# Patient Record
Sex: MALE | Race: OTHER | NOT HISPANIC OR LATINO | Employment: OTHER | URBAN - METROPOLITAN AREA
[De-identification: names, ages, dates, MRNs, and addresses within clinical notes are randomized per-mention and may not be internally consistent; named-entity substitution may affect disease eponyms.]

---

## 2023-09-05 ENCOUNTER — HOSPITAL ENCOUNTER (INPATIENT)
Facility: HOSPITAL | Age: 33
LOS: 10 days | Discharge: HOME HEALTH | DRG: 952 | End: 2023-09-15
Attending: STUDENT IN AN ORGANIZED HEALTH CARE EDUCATION/TRAINING PROGRAM | Admitting: STUDENT IN AN ORGANIZED HEALTH CARE EDUCATION/TRAINING PROGRAM
Payer: COMMERCIAL

## 2023-09-05 ENCOUNTER — ANESTHESIA (EMERGENCY)
Dept: PERIOP | Facility: HOSPITAL | Age: 33
DRG: 952 | End: 2023-09-05
Payer: COMMERCIAL

## 2023-09-05 ENCOUNTER — ANESTHESIA EVENT (EMERGENCY)
Dept: PERIOP | Facility: HOSPITAL | Age: 33
DRG: 952 | End: 2023-09-05
Payer: COMMERCIAL

## 2023-09-05 ENCOUNTER — APPOINTMENT (EMERGENCY)
Dept: RADIOLOGY | Facility: HOSPITAL | Age: 33
DRG: 952 | End: 2023-09-05
Payer: COMMERCIAL

## 2023-09-05 ENCOUNTER — APPOINTMENT (INPATIENT)
Dept: RADIOLOGY | Facility: HOSPITAL | Age: 33
DRG: 952 | End: 2023-09-05
Payer: COMMERCIAL

## 2023-09-05 DIAGNOSIS — F43.0 ACUTE STRESS REACTION: ICD-10-CM
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
## 2023-09-05 DIAGNOSIS — S43.014A ANTERIOR DISLOCATION OF RIGHT SHOULDER: ICD-10-CM

## 2023-09-05 DIAGNOSIS — S43.034A: ICD-10-CM

## 2023-09-05 LAB
ABO GROUP BLD BPU: NORMAL
ABO GROUP BLD: NORMAL
ABO GROUP BLD: NORMAL
ALBUMIN SERPL BCP-MCNC: 3.8 G/DL (ref 3.5–5)
ALP SERPL-CCNC: 36 U/L (ref 34–104)
ALT SERPL W P-5'-P-CCNC: 85 U/L (ref 7–52)
ANION GAP SERPL CALCULATED.3IONS-SCNC: 8 MMOL/L
APTT PPP: 35 SECONDS (ref 23–37)
AST SERPL W P-5'-P-CCNC: 76 U/L (ref 13–39)
ATRIAL RATE: 104 BPM
BASE EXCESS BLDA CALC-SCNC: -16 MMOL/L (ref -2–3)
BASE EXCESS BLDA CALC-SCNC: -18 MMOL/L (ref -2–3)
BASE EXCESS BLDA CALC-SCNC: -5.9 MMOL/L
BASE EXCESS BLDA CALC-SCNC: -9 MMOL/L (ref -2–3)
BASOPHILS # BLD AUTO: 0.04 THOUSANDS/ÂΜL (ref 0–0.1)
BASOPHILS NFR BLD AUTO: 0 % (ref 0–1)
BILIRUB SERPL-MCNC: 0.9 MG/DL (ref 0.2–1)
BLD GP AB SCN SERPL QL: NEGATIVE
BPU ID: NORMAL
BUN SERPL-MCNC: 11 MG/DL (ref 5–25)
CA-I BLD-SCNC: 1.09 MMOL/L (ref 1.12–1.32)
CA-I BLD-SCNC: 1.14 MMOL/L (ref 1.12–1.32)
CA-I BLD-SCNC: 1.15 MMOL/L (ref 1.12–1.32)
CA-I BLD-SCNC: 1.19 MMOL/L (ref 1.12–1.32)
CALCIUM SERPL-MCNC: 7.8 MG/DL (ref 8.4–10.2)
CFFMA (FUNCTIONAL FIBRINOGEN MAX AMPLITUDE): 9.4 MM (ref 15–32)
CHLORIDE SERPL-SCNC: 112 MMOL/L (ref 96–108)
CK SERPL-CCNC: 906 U/L (ref 39–308)
CKLY30: 0 % (ref 0–2.6)
CKR(REACTION TIME): 7.3 MIN (ref 4.6–9.1)
CO2 SERPL-SCNC: 22 MMOL/L (ref 21–32)
CREAT SERPL-MCNC: 1.12 MG/DL (ref 0.6–1.3)
CRTMA(RAPIDTEG MAX AMPLITUDE): 44.9 MM (ref 52–70)
DS:DELIVERY SYSTEM: 1
EOSINOPHIL # BLD AUTO: 0 THOUSAND/ÂΜL (ref 0–0.61)
EOSINOPHIL NFR BLD AUTO: 0 % (ref 0–6)
ERYTHROCYTE [DISTWIDTH] IN BLOOD BY AUTOMATED COUNT: 13.5 % (ref 11.6–15.1)
GFR SERPL CREATININE-BSD FRML MDRD: 85 ML/MIN/1.73SQ M
GLUCOSE SERPL-MCNC: 151 MG/DL (ref 65–140)
GLUCOSE SERPL-MCNC: 199 MG/DL (ref 65–140)
GLUCOSE SERPL-MCNC: 225 MG/DL (ref 65–140)
GLUCOSE SERPL-MCNC: 239 MG/DL (ref 65–140)
HCO3 BLDA-SCNC: 13.7 MMOL/L (ref 22–28)
HCO3 BLDA-SCNC: 14.4 MMOL/L (ref 22–28)
HCO3 BLDA-SCNC: 17.3 MMOL/L (ref 22–28)
HCO3 BLDA-SCNC: 19.8 MMOL/L (ref 22–28)
HCT VFR BLD AUTO: 34.3 % (ref 36.5–49.3)
HCT VFR BLD AUTO: 37.8 % (ref 36.5–49.3)
HCT VFR BLD CALC: 29 % (ref 36.5–49.3)
HCT VFR BLD CALC: 31 % (ref 36.5–49.3)
HCT VFR BLD CALC: 32 % (ref 36.5–49.3)
HGB BLD-MCNC: 12.3 G/DL (ref 12–17)
HGB BLD-MCNC: 13.5 G/DL (ref 12–17)
HGB BLDA-MCNC: 10.5 G/DL (ref 12–17)
HGB BLDA-MCNC: 10.9 G/DL (ref 12–17)
HGB BLDA-MCNC: 9.9 G/DL (ref 12–17)
IMM GRANULOCYTES # BLD AUTO: 0.26 THOUSAND/UL (ref 0–0.2)
IMM GRANULOCYTES NFR BLD AUTO: 1 % (ref 0–2)
INR PPP: 1.42 (ref 0.84–1.19)
LACTATE SERPL-SCNC: 5.5 MMOL/L (ref 0.5–2)
LYMPHOCYTES # BLD AUTO: 1.01 THOUSANDS/ÂΜL (ref 0.6–4.47)
LYMPHOCYTES NFR BLD AUTO: 5 % (ref 14–44)
MAGNESIUM SERPL-MCNC: 2.1 MG/DL (ref 1.9–2.7)
MCH RBC QN AUTO: 30.6 PG (ref 26.8–34.3)
MCHC RBC AUTO-ENTMCNC: 35.7 G/DL (ref 31.4–37.4)
MCV RBC AUTO: 86 FL (ref 82–98)
MONOCYTES # BLD AUTO: 1.49 THOUSAND/ÂΜL (ref 0.17–1.22)
MONOCYTES NFR BLD AUTO: 7 % (ref 4–12)
NEUTROPHILS # BLD AUTO: 18.72 THOUSANDS/ÂΜL (ref 1.85–7.62)
NEUTS SEG NFR BLD AUTO: 87 % (ref 43–75)
NRBC BLD AUTO-RTO: 0 /100 WBCS
O2 CT BLDA-SCNC: 20.3 ML/DL (ref 16–23)
OXYHGB MFR BLDA: 98.4 % (ref 94–97)
P AXIS: 66 DEGREES
PCO2 BLD: 15 MMOL/L (ref 21–32)
PCO2 BLD: 16 MMOL/L (ref 21–32)
PCO2 BLD: 19 MMOL/L (ref 21–32)
PCO2 BLD: 40.5 MM HG (ref 36–44)
PCO2 BLD: 49.9 MM HG (ref 36–44)
PCO2 BLD: 67.5 MM HG (ref 36–44)
PCO2 BLDA: 39.6 MM HG (ref 36–44)
PH BLD: 6.94 [PH] (ref 7.35–7.45)
PH BLD: 7.04 [PH] (ref 7.35–7.45)
PH BLD: 7.24 [PH] (ref 7.35–7.45)
PH BLDA: 7.32 [PH] (ref 7.35–7.45)
PHOSPHATE SERPL-MCNC: 2.6 MG/DL (ref 2.7–4.5)
PLATELET # BLD AUTO: 143 THOUSANDS/UL (ref 149–390)
PMV BLD AUTO: 9.7 FL (ref 8.9–12.7)
PO2 BLD: 157 MM HG (ref 75–129)
PO2 BLD: >400 MM HG (ref 75–129)
PO2 BLD: >400 MM HG (ref 75–129)
PO2 BLDA: 307.4 MM HG (ref 75–129)
POTASSIUM BLD-SCNC: 3.3 MMOL/L (ref 3.5–5.3)
POTASSIUM BLD-SCNC: 3.7 MMOL/L (ref 3.5–5.3)
POTASSIUM BLD-SCNC: 4 MMOL/L (ref 3.5–5.3)
POTASSIUM SERPL-SCNC: 4.6 MMOL/L (ref 3.5–5.3)
PR INTERVAL: 163 MS
PROT SERPL-MCNC: 5.4 G/DL (ref 6.4–8.4)
PROTHROMBIN TIME: 17.6 SECONDS (ref 11.6–14.5)
QRS AXIS: 74 DEGREES
QRSD INTERVAL: 113 MS
QT INTERVAL: 367 MS
QTC INTERVAL: 483 MS
RBC # BLD AUTO: 4.41 MILLION/UL (ref 3.88–5.62)
RH BLD: POSITIVE
RH BLD: POSITIVE
SAO2 % BLD FROM PO2: 99 % (ref 60–85)
SIMV VENT INSPIRED AIR FIO2: 60
SIMV VENT PEEP: 6
SIMV VENT TIDAL VOLUME: 500
SIMV VENT: ABNORMAL
SODIUM BLD-SCNC: 144 MMOL/L (ref 136–145)
SODIUM BLD-SCNC: 145 MMOL/L (ref 136–145)
SODIUM BLD-SCNC: 145 MMOL/L (ref 136–145)
SODIUM SERPL-SCNC: 142 MMOL/L (ref 135–147)
SPECIMEN EXPIRATION DATE: NORMAL
SPECIMEN SOURCE: ABNORMAL
T WAVE AXIS: 36 DEGREES
UNIT DISPENSE STATUS: NORMAL
UNIT PRODUCT CODE: NORMAL
UNIT PRODUCT VOLUME: 238 ML
UNIT PRODUCT VOLUME: 250 ML
UNIT PRODUCT VOLUME: 280 ML
UNIT PRODUCT VOLUME: 300 ML
UNIT PRODUCT VOLUME: 350 ML
UNIT RH: NORMAL
VENTRICULAR RATE: 104 BPM
WBC # BLD AUTO: 21.52 THOUSAND/UL (ref 4.31–10.16)

## 2023-09-05 PROCEDURE — 99291 CRITICAL CARE FIRST HOUR: CPT | Performed by: EMERGENCY MEDICINE

## 2023-09-05 PROCEDURE — 86850 RBC ANTIBODY SCREEN: CPT | Performed by: PHYSICIAN ASSISTANT

## 2023-09-05 PROCEDURE — 86900 BLOOD TYPING SEROLOGIC ABO: CPT | Performed by: PHYSICIAN ASSISTANT

## 2023-09-05 PROCEDURE — 5A12012 PERFORMANCE OF CARDIAC OUTPUT, SINGLE, MANUAL: ICD-10-PCS | Performed by: UROLOGY

## 2023-09-05 PROCEDURE — 86901 BLOOD TYPING SEROLOGIC RH(D): CPT | Performed by: PHYSICIAN ASSISTANT

## 2023-09-05 PROCEDURE — 02HV33Z INSERTION OF INFUSION DEVICE INTO SUPERIOR VENA CAVA, PERCUTANEOUS APPROACH: ICD-10-PCS | Performed by: NURSE ANESTHETIST, CERTIFIED REGISTERED

## 2023-09-05 PROCEDURE — 84132 ASSAY OF SERUM POTASSIUM: CPT

## 2023-09-05 PROCEDURE — 93005 ELECTROCARDIOGRAM TRACING: CPT

## 2023-09-05 PROCEDURE — 71260 CT THORAX DX C+: CPT

## 2023-09-05 PROCEDURE — 84295 ASSAY OF SERUM SODIUM: CPT

## 2023-09-05 PROCEDURE — 99255 IP/OBS CONSLTJ NEW/EST HI 80: CPT | Performed by: SURGERY

## 2023-09-05 PROCEDURE — G0390 TRAUMA RESPONS W/HOSP CRITI: HCPCS

## 2023-09-05 PROCEDURE — 80053 COMPREHEN METABOLIC PANEL: CPT | Performed by: PHYSICIAN ASSISTANT

## 2023-09-05 PROCEDURE — 82803 BLOOD GASES ANY COMBINATION: CPT

## 2023-09-05 PROCEDURE — 83605 ASSAY OF LACTIC ACID: CPT | Performed by: PHYSICIAN ASSISTANT

## 2023-09-05 PROCEDURE — 92950 HEART/LUNG RESUSCITATION CPR: CPT | Performed by: SURGERY

## 2023-09-05 PROCEDURE — 85014 HEMATOCRIT: CPT | Performed by: EMERGENCY MEDICINE

## 2023-09-05 PROCEDURE — 90715 TDAP VACCINE 7 YRS/> IM: CPT | Performed by: STUDENT IN AN ORGANIZED HEALTH CARE EDUCATION/TRAINING PROGRAM

## 2023-09-05 PROCEDURE — 85576 BLOOD PLATELET AGGREGATION: CPT | Performed by: PHYSICIAN ASSISTANT

## 2023-09-05 PROCEDURE — 20103 EXPL PENTRG WOUND EXTREMITY: CPT | Performed by: STUDENT IN AN ORGANIZED HEALTH CARE EDUCATION/TRAINING PROGRAM

## 2023-09-05 PROCEDURE — 99291 CRITICAL CARE FIRST HOUR: CPT | Performed by: PHYSICIAN ASSISTANT

## 2023-09-05 PROCEDURE — 0BH17EZ INSERTION OF ENDOTRACHEAL AIRWAY INTO TRACHEA, VIA NATURAL OR ARTIFICIAL OPENING: ICD-10-PCS | Performed by: ANESTHESIOLOGY

## 2023-09-05 PROCEDURE — 85610 PROTHROMBIN TIME: CPT | Performed by: PHYSICIAN ASSISTANT

## 2023-09-05 PROCEDURE — 93010 ELECTROCARDIOGRAM REPORT: CPT | Performed by: INTERNAL MEDICINE

## 2023-09-05 PROCEDURE — 86920 COMPATIBILITY TEST SPIN: CPT

## 2023-09-05 PROCEDURE — 5A1945Z RESPIRATORY VENTILATION, 24-96 CONSECUTIVE HOURS: ICD-10-PCS | Performed by: ANESTHESIOLOGY

## 2023-09-05 PROCEDURE — P9016 RBC LEUKOCYTES REDUCED: HCPCS

## 2023-09-05 PROCEDURE — 85347 COAGULATION TIME ACTIVATED: CPT | Performed by: PHYSICIAN ASSISTANT

## 2023-09-05 PROCEDURE — 27301 DRAIN THIGH/KNEE LESION: CPT | Performed by: SURGERY

## 2023-09-05 PROCEDURE — 85730 THROMBOPLASTIN TIME PARTIAL: CPT | Performed by: PHYSICIAN ASSISTANT

## 2023-09-05 PROCEDURE — 82330 ASSAY OF CALCIUM: CPT

## 2023-09-05 PROCEDURE — 85018 HEMOGLOBIN: CPT | Performed by: EMERGENCY MEDICINE

## 2023-09-05 PROCEDURE — 82947 ASSAY GLUCOSE BLOOD QUANT: CPT

## 2023-09-05 PROCEDURE — 90471 IMMUNIZATION ADMIN: CPT

## 2023-09-05 PROCEDURE — 82330 ASSAY OF CALCIUM: CPT | Performed by: PHYSICIAN ASSISTANT

## 2023-09-05 PROCEDURE — 10120 INC&RMVL FB SUBQ TISS SMPL: CPT | Performed by: UROLOGY

## 2023-09-05 PROCEDURE — 85397 CLOTTING FUNCT ACTIVITY: CPT | Performed by: PHYSICIAN ASSISTANT

## 2023-09-05 PROCEDURE — EDAIR PR ED AIR: Performed by: EMERGENCY MEDICINE

## 2023-09-05 PROCEDURE — 82550 ASSAY OF CK (CPK): CPT | Performed by: PHYSICIAN ASSISTANT

## 2023-09-05 PROCEDURE — 0VCS3ZZ EXTIRPATION OF MATTER FROM PENIS, PERCUTANEOUS APPROACH: ICD-10-PCS | Performed by: UROLOGY

## 2023-09-05 PROCEDURE — 85025 COMPLETE CBC W/AUTO DIFF WBC: CPT | Performed by: PHYSICIAN ASSISTANT

## 2023-09-05 PROCEDURE — 74177 CT ABD & PELVIS W/CONTRAST: CPT

## 2023-09-05 PROCEDURE — 84100 ASSAY OF PHOSPHORUS: CPT | Performed by: PHYSICIAN ASSISTANT

## 2023-09-05 PROCEDURE — 71045 X-RAY EXAM CHEST 1 VIEW: CPT

## 2023-09-05 PROCEDURE — 94760 N-INVAS EAR/PLS OXIMETRY 1: CPT

## 2023-09-05 PROCEDURE — 85014 HEMATOCRIT: CPT

## 2023-09-05 PROCEDURE — 83735 ASSAY OF MAGNESIUM: CPT | Performed by: PHYSICIAN ASSISTANT

## 2023-09-05 PROCEDURE — 94002 VENT MGMT INPAT INIT DAY: CPT

## 2023-09-05 PROCEDURE — 82805 BLOOD GASES W/O2 SATURATION: CPT | Performed by: PHYSICIAN ASSISTANT

## 2023-09-05 PROCEDURE — 85384 FIBRINOGEN ACTIVITY: CPT | Performed by: PHYSICIAN ASSISTANT

## 2023-09-05 PROCEDURE — 0TJB8ZZ INSPECTION OF BLADDER, VIA NATURAL OR ARTIFICIAL OPENING ENDOSCOPIC: ICD-10-PCS | Performed by: UROLOGY

## 2023-09-05 PROCEDURE — 52000 CYSTOURETHROSCOPY: CPT | Performed by: UROLOGY

## 2023-09-05 PROCEDURE — 99291 CRITICAL CARE FIRST HOUR: CPT

## 2023-09-05 PROCEDURE — 96374 THER/PROPH/DIAG INJ IV PUSH: CPT

## 2023-09-05 RX ORDER — ALBUMIN, HUMAN INJ 5% 5 %
SOLUTION INTRAVENOUS CONTINUOUS PRN
Status: DISCONTINUED | OUTPATIENT
Start: 2023-09-05 | End: 2023-09-05

## 2023-09-05 RX ORDER — PROPOFOL 10 MG/ML
5-50 INJECTION, EMULSION INTRAVENOUS
Status: DISCONTINUED | OUTPATIENT
Start: 2023-09-05 | End: 2023-09-06

## 2023-09-05 RX ORDER — CEFAZOLIN SODIUM 1 G/3ML
2 INJECTION, POWDER, FOR SOLUTION INTRAMUSCULAR; INTRAVENOUS ONCE
Status: COMPLETED | OUTPATIENT
Start: 2023-09-05 | End: 2023-09-05

## 2023-09-05 RX ORDER — MAGNESIUM HYDROXIDE 1200 MG/15ML
LIQUID ORAL AS NEEDED
Status: DISCONTINUED | OUTPATIENT
Start: 2023-09-05 | End: 2023-09-05 | Stop reason: HOSPADM

## 2023-09-05 RX ORDER — FENTANYL CITRATE 50 UG/ML
INJECTION, SOLUTION INTRAMUSCULAR; INTRAVENOUS AS NEEDED
Status: DISCONTINUED | OUTPATIENT
Start: 2023-09-05 | End: 2023-09-05

## 2023-09-05 RX ORDER — FENTANYL CITRATE 50 UG/ML
INJECTION, SOLUTION INTRAMUSCULAR; INTRAVENOUS CODE/TRAUMA/SEDATION MEDICATION
Status: COMPLETED | OUTPATIENT
Start: 2023-09-05 | End: 2023-09-05

## 2023-09-05 RX ORDER — SODIUM CHLORIDE 9 MG/ML
INJECTION, SOLUTION INTRAVENOUS CONTINUOUS PRN
Status: DISCONTINUED | OUTPATIENT
Start: 2023-09-05 | End: 2023-09-05

## 2023-09-05 RX ORDER — CHLORHEXIDINE GLUCONATE ORAL RINSE 1.2 MG/ML
15 SOLUTION DENTAL EVERY 12 HOURS SCHEDULED
Status: DISCONTINUED | OUTPATIENT
Start: 2023-09-05 | End: 2023-09-07

## 2023-09-05 RX ORDER — NEOMYCIN SULFATE, POLYMYXIN B SULFATE AND BACITRACIN ZINC 3.5; 10000; 4 MG/G; [USP'U]/G; [USP'U]/G
OINTMENT OPHTHALMIC AS NEEDED
Status: DISCONTINUED | OUTPATIENT
Start: 2023-09-05 | End: 2023-09-05 | Stop reason: HOSPADM

## 2023-09-05 RX ORDER — SODIUM CHLORIDE, SODIUM LACTATE, POTASSIUM CHLORIDE, CALCIUM CHLORIDE 600; 310; 30; 20 MG/100ML; MG/100ML; MG/100ML; MG/100ML
INJECTION, SOLUTION INTRAVENOUS CONTINUOUS PRN
Status: DISCONTINUED | OUTPATIENT
Start: 2023-09-05 | End: 2023-09-05

## 2023-09-05 RX ORDER — AMOXICILLIN 250 MG
2 CAPSULE ORAL 2 TIMES DAILY
Status: DISCONTINUED | OUTPATIENT
Start: 2023-09-05 | End: 2023-09-15 | Stop reason: HOSPADM

## 2023-09-05 RX ORDER — SODIUM CHLORIDE, SODIUM GLUCONATE, SODIUM ACETATE, POTASSIUM CHLORIDE, MAGNESIUM CHLORIDE, SODIUM PHOSPHATE, DIBASIC, AND POTASSIUM PHOSPHATE .53; .5; .37; .037; .03; .012; .00082 G/100ML; G/100ML; G/100ML; G/100ML; G/100ML; G/100ML; G/100ML
125 INJECTION, SOLUTION INTRAVENOUS CONTINUOUS
Status: DISCONTINUED | OUTPATIENT
Start: 2023-09-05 | End: 2023-09-07

## 2023-09-05 RX ORDER — FENTANYL CITRATE 50 UG/ML
1 INJECTION, SOLUTION INTRAMUSCULAR; INTRAVENOUS ONCE
Status: COMPLETED | OUTPATIENT
Start: 2023-09-05 | End: 2023-09-05

## 2023-09-05 RX ORDER — FENTANYL CITRATE-0.9 % NACL/PF 10 MCG/ML
100 PLASTIC BAG, INJECTION (ML) INTRAVENOUS CONTINUOUS
Status: DISCONTINUED | OUTPATIENT
Start: 2023-09-05 | End: 2023-09-06

## 2023-09-05 RX ORDER — ROCURONIUM BROMIDE 10 MG/ML
INJECTION, SOLUTION INTRAVENOUS AS NEEDED
Status: DISCONTINUED | OUTPATIENT
Start: 2023-09-05 | End: 2023-09-05

## 2023-09-05 RX ORDER — FENTANYL CITRATE 50 UG/ML
50 INJECTION, SOLUTION INTRAMUSCULAR; INTRAVENOUS ONCE
Status: COMPLETED | OUTPATIENT
Start: 2023-09-05 | End: 2023-09-05

## 2023-09-05 RX ORDER — SUCCINYLCHOLINE/SOD CL,ISO/PF 100 MG/5ML
SYRINGE (ML) INTRAVENOUS AS NEEDED
Status: DISCONTINUED | OUTPATIENT
Start: 2023-09-05 | End: 2023-09-05

## 2023-09-05 RX ORDER — BUPIVACAINE HYDROCHLORIDE 2.5 MG/ML
INJECTION, SOLUTION EPIDURAL; INFILTRATION; INTRACAUDAL AS NEEDED
Status: DISCONTINUED | OUTPATIENT
Start: 2023-09-05 | End: 2023-09-05 | Stop reason: HOSPADM

## 2023-09-05 RX ORDER — PROPOFOL 10 MG/ML
INJECTION, EMULSION INTRAVENOUS CONTINUOUS PRN
Status: DISCONTINUED | OUTPATIENT
Start: 2023-09-05 | End: 2023-09-05

## 2023-09-05 RX ORDER — SODIUM CHLORIDE, SODIUM GLUCONATE, SODIUM ACETATE, POTASSIUM CHLORIDE, MAGNESIUM CHLORIDE, SODIUM PHOSPHATE, DIBASIC, AND POTASSIUM PHOSPHATE .53; .5; .37; .037; .03; .012; .00082 G/100ML; G/100ML; G/100ML; G/100ML; G/100ML; G/100ML; G/100ML
1000 INJECTION, SOLUTION INTRAVENOUS ONCE
Status: COMPLETED | OUTPATIENT
Start: 2023-09-05 | End: 2023-09-05

## 2023-09-05 RX ORDER — PROPOFOL 10 MG/ML
INJECTION, EMULSION INTRAVENOUS AS NEEDED
Status: DISCONTINUED | OUTPATIENT
Start: 2023-09-05 | End: 2023-09-05

## 2023-09-05 RX ORDER — VASOPRESSIN 20 U/ML
INJECTION PARENTERAL AS NEEDED
Status: DISCONTINUED | OUTPATIENT
Start: 2023-09-05 | End: 2023-09-05

## 2023-09-05 RX ORDER — HYDROMORPHONE HYDROCHLORIDE 2 MG/ML
INJECTION, SOLUTION INTRAMUSCULAR; INTRAVENOUS; SUBCUTANEOUS AS NEEDED
Status: DISCONTINUED | OUTPATIENT
Start: 2023-09-05 | End: 2023-09-05

## 2023-09-05 RX ORDER — CALCIUM CHLORIDE 100 MG/ML
INJECTION INTRAVENOUS; INTRAVENTRICULAR AS NEEDED
Status: DISCONTINUED | OUTPATIENT
Start: 2023-09-05 | End: 2023-09-05

## 2023-09-05 RX ORDER — FENTANYL CITRATE-0.9 % NACL/PF 10 MCG/ML
50 PLASTIC BAG, INJECTION (ML) INTRAVENOUS CONTINUOUS
Status: DISCONTINUED | OUTPATIENT
Start: 2023-09-05 | End: 2023-09-05

## 2023-09-05 RX ORDER — BISACODYL 10 MG
10 SUPPOSITORY, RECTAL RECTAL DAILY PRN
Status: DISCONTINUED | OUTPATIENT
Start: 2023-09-05 | End: 2023-09-15 | Stop reason: HOSPADM

## 2023-09-05 RX ORDER — FENTANYL CITRATE 50 UG/ML
50 INJECTION, SOLUTION INTRAMUSCULAR; INTRAVENOUS
Status: DISCONTINUED | OUTPATIENT
Start: 2023-09-05 | End: 2023-09-07

## 2023-09-05 RX ADMIN — SODIUM BICARBONATE 50 MEQ: 84 INJECTION, SOLUTION INTRAVENOUS at 17:53

## 2023-09-05 RX ADMIN — SODIUM CHLORIDE, SODIUM LACTATE, POTASSIUM CHLORIDE, AND CALCIUM CHLORIDE: .6; .31; .03; .02 INJECTION, SOLUTION INTRAVENOUS at 17:48

## 2023-09-05 RX ADMIN — FENTANYL CITRATE 50 MCG: 50 INJECTION, SOLUTION INTRAMUSCULAR; INTRAVENOUS at 18:16

## 2023-09-05 RX ADMIN — FENTANYL CITRATE 50 MCG: 50 INJECTION INTRAMUSCULAR; INTRAVENOUS at 19:56

## 2023-09-05 RX ADMIN — FENTANYL CITRATE 50 MCG: 50 INJECTION, SOLUTION INTRAMUSCULAR; INTRAVENOUS at 18:05

## 2023-09-05 RX ADMIN — PROPOFOL 150 MG: 10 INJECTION, EMULSION INTRAVENOUS at 17:05

## 2023-09-05 RX ADMIN — ALBUMIN (HUMAN): 12.5 INJECTION, SOLUTION INTRAVENOUS at 17:25

## 2023-09-05 RX ADMIN — PHENYLEPHRINE HYDROCHLORIDE 100 MCG/MIN: 10 INJECTION INTRAVENOUS at 17:11

## 2023-09-05 RX ADMIN — PHENYLEPHRINE HYDROCHLORIDE 200 MCG: 10 INJECTION INTRAVENOUS at 17:11

## 2023-09-05 RX ADMIN — SODIUM CHLORIDE, SODIUM GLUCONATE, SODIUM ACETATE, POTASSIUM CHLORIDE, MAGNESIUM CHLORIDE, SODIUM PHOSPHATE, DIBASIC, AND POTASSIUM PHOSPHATE 125 ML/HR: .53; .5; .37; .037; .03; .012; .00082 INJECTION, SOLUTION INTRAVENOUS at 20:03

## 2023-09-05 RX ADMIN — VASOPRESSIN 3 UNITS: 20 INJECTION, SOLUTION INTRAMUSCULAR; SUBCUTANEOUS at 17:14

## 2023-09-05 RX ADMIN — ROCURONIUM BROMIDE 20 MG: 10 INJECTION, SOLUTION INTRAVENOUS at 18:03

## 2023-09-05 RX ADMIN — FENTANYL CITRATE 50 MCG: 50 INJECTION, SOLUTION INTRAMUSCULAR; INTRAVENOUS at 16:53

## 2023-09-05 RX ADMIN — TETANUS TOXOID, REDUCED DIPHTHERIA TOXOID AND ACELLULAR PERTUSSIS VACCINE, ADSORBED 0.5 ML: 5; 2.5; 8; 8; 2.5 SUSPENSION INTRAMUSCULAR at 16:35

## 2023-09-05 RX ADMIN — FENTANYL CITRATE 50 MCG: 50 INJECTION INTRAMUSCULAR; INTRAVENOUS at 22:48

## 2023-09-05 RX ADMIN — FENTANYL CITRATE 50 MCG: 50 INJECTION INTRAMUSCULAR; INTRAVENOUS at 20:13

## 2023-09-05 RX ADMIN — PHENYLEPHRINE HYDROCHLORIDE 400 MCG: 10 INJECTION INTRAVENOUS at 17:09

## 2023-09-05 RX ADMIN — HYDROMORPHONE HYDROCHLORIDE 2 MG: 2 INJECTION INTRAMUSCULAR; INTRAVENOUS; SUBCUTANEOUS at 18:47

## 2023-09-05 RX ADMIN — SODIUM CHLORIDE: 0.9 INJECTION, SOLUTION INTRAVENOUS at 16:59

## 2023-09-05 RX ADMIN — CHLORHEXIDINE GLUCONATE 15 ML: 1.2 SOLUTION ORAL at 20:16

## 2023-09-05 RX ADMIN — ROCURONIUM BROMIDE 10 MG: 10 INJECTION, SOLUTION INTRAVENOUS at 18:49

## 2023-09-05 RX ADMIN — ALBUMIN (HUMAN): 12.5 INJECTION, SOLUTION INTRAVENOUS at 17:06

## 2023-09-05 RX ADMIN — Medication 1000 MCG: at 17:08

## 2023-09-05 RX ADMIN — FENTANYL CITRATE 50 MCG: 50 INJECTION, SOLUTION INTRAMUSCULAR; INTRAVENOUS at 16:45

## 2023-09-05 RX ADMIN — CALCIUM CHLORIDE 1 G: 100 INJECTION INTRAVENOUS; INTRAVENTRICULAR at 17:13

## 2023-09-05 RX ADMIN — SODIUM CHLORIDE: 0.9 INJECTION, SOLUTION INTRAVENOUS at 17:46

## 2023-09-05 RX ADMIN — EPINEPHRINE 10 MCG/MIN: 1 INJECTION, SOLUTION, CONCENTRATE INTRAVENOUS at 17:11

## 2023-09-05 RX ADMIN — Medication 100 MG: at 17:05

## 2023-09-05 RX ADMIN — FENTANYL CITRATE 100 MCG: 50 INJECTION, SOLUTION INTRAMUSCULAR; INTRAVENOUS at 17:05

## 2023-09-05 RX ADMIN — IOHEXOL 100 ML: 350 INJECTION, SOLUTION INTRAVENOUS at 16:56

## 2023-09-05 RX ADMIN — PHENYLEPHRINE HYDROCHLORIDE 200 MCG: 10 INJECTION INTRAVENOUS at 17:07

## 2023-09-05 RX ADMIN — SODIUM BICARBONATE 50 MEQ: 84 INJECTION, SOLUTION INTRAVENOUS at 17:55

## 2023-09-05 RX ADMIN — ROCURONIUM BROMIDE 50 MG: 10 INJECTION, SOLUTION INTRAVENOUS at 17:15

## 2023-09-05 RX ADMIN — Medication 50 MCG/HR: at 19:52

## 2023-09-05 RX ADMIN — SODIUM CHLORIDE, SODIUM GLUCONATE, SODIUM ACETATE, POTASSIUM CHLORIDE, MAGNESIUM CHLORIDE, SODIUM PHOSPHATE, DIBASIC, AND POTASSIUM PHOSPHATE 1000 ML: .53; .5; .37; .037; .03; .012; .00082 INJECTION, SOLUTION INTRAVENOUS at 20:47

## 2023-09-05 RX ADMIN — PROPOFOL 50 MCG/KG/MIN: 10 INJECTION, EMULSION INTRAVENOUS at 18:51

## 2023-09-05 RX ADMIN — SODIUM CHLORIDE, SODIUM LACTATE, POTASSIUM CHLORIDE, AND CALCIUM CHLORIDE: .6; .31; .03; .02 INJECTION, SOLUTION INTRAVENOUS at 16:59

## 2023-09-05 RX ADMIN — FENTANYL CITRATE 50 MCG: 50 INJECTION INTRAMUSCULAR; INTRAVENOUS at 21:39

## 2023-09-05 RX ADMIN — PROPOFOL 50 MCG/KG/MIN: 10 INJECTION, EMULSION INTRAVENOUS at 21:28

## 2023-09-05 RX ADMIN — SODIUM CHLORIDE: 0.9 INJECTION, SOLUTION INTRAVENOUS at 18:07

## 2023-09-05 RX ADMIN — FENTANYL CITRATE 50 MCG: 50 INJECTION INTRAMUSCULAR; INTRAVENOUS at 22:13

## 2023-09-05 RX ADMIN — SENNOSIDES AND DOCUSATE SODIUM 2 TABLET: 50; 8.6 TABLET ORAL at 20:16

## 2023-09-05 RX ADMIN — Medication 1000 MCG: at 17:10
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
## 2023-09-05 NOTE — LETTER
[address] [place] [address] 6  [address] [address]  Dept: [phone]    September 15, 2023     Patient: Geovanny Diallo   YOB: 1990   Date of Visit: 9/5/2023       To Whom it May Concern:    Geovanny Diallo is under my professional care. He was seen in the hospital from 9/5/2023 to 09/15/23. He  May not return to work until he is cleared by the trauma service after his appointment on 9/25 . If you have any questions or concerns, please don't hesitate to call.          Sincerely,          Chino Merchant PA-C

## 2023-09-05 NOTE — PLAN OF CARE
Problem: Prexisting or High Potential for Compromised Skin Integrity  Goal: Skin integrity is maintained or improved  Description: INTERVENTIONS:  - Identify patients at risk for skin breakdown  - Assess and monitor skin integrity  - Assess and monitor nutrition and hydration status  - Monitor labs   - Assess for incontinence   - Turn and reposition patient  - Assist with mobility/ambulation  - Relieve pressure over bony prominences  - Avoid friction and shearing  - Provide appropriate hygiene as needed including keeping skin clean and dry  - Evaluate need for skin moisturizer/barrier cream  - Collaborate with interdisciplinary team   - Patient/family teaching  - Consider wound care consult   Outcome: Progressing     Problem: MOBILITY - ADULT  Goal: Maintain or return to baseline ADL function  Description: INTERVENTIONS:  -  Assess patient's ability to carry out ADLs; assess patient's baseline for ADL function and identify physical deficits which impact ability to perform ADLs (bathing, care of mouth/teeth, toileting, grooming, dressing, etc.)  - Assess/evaluate cause of self-care deficits   - Assess range of motion  - Assess patient's mobility; develop plan if impaired  - Assess patient's need for assistive devices and provide as appropriate  - Encourage maximum independence but intervene and supervise when necessary  - Involve family in performance of ADLs  - Assess for home care needs following discharge   - Consider OT consult to assist with ADL evaluation and planning for discharge  - Provide patient education as appropriate  Outcome: Progressing  Goal: Maintains/Returns to pre admission functional level  Description: INTERVENTIONS:  - Perform BMAT or MOVE assessment daily.   - Set and communicate daily mobility goal to care team and patient/family/caregiver. - Collaborate with rehabilitation services on mobility goals if consulted  - Perform Range of Motion 3 times a day.   - Reposition patient every 2 hours.  - Dangle patient 3 times a day  - Stand patient 3 times a day  - Ambulate patient 3 times a day  - Out of bed to chair 3 times a day   - Out of bed for meals 3 times a day  - Out of bed for toileting  - Record patient progress and toleration of activity level   Outcome: Progressing

## 2023-09-05 NOTE — ANESTHESIA PROCEDURE NOTES
Central Line Insertion    Performed by: Carl Fuentes CRNA  Authorized by: Emily Gallego MD    Date/Time: 9/5/2023 5:00 PM  Catheter Type:  single lumen  Consent: The procedure was performed in an emergent situation. Test results: test results available and properly labeled  Site marked: the operative site was marked  Required items: required blood products, implants, devices, and special equipment available  Patient identity confirmed: arm band  Time out: Immediately prior to procedure a "time out" was called to verify the correct patient, procedure, equipment, support staff and site/side marked as required. Indications: vascular access  Patient position: flat  Preparation: skin prepped with ChloraPrep  Skin prep agent dried: skin prep agent completely dried prior to procedure  Sterile barriers: all five maximum sterile barriers used - cap, mask, sterile gown, sterile gloves, and large sterile sheet  Hand hygiene: hand hygiene performed prior to central venous catheter insertion  sterile gel and probe cover used in ultrasound-guided central venous catheter insertionReason All Sterile Barriers Not Used:   All elements of maximal sterile barrier technique not followed for medical reasons  Number of attempts: 1  Successful placement: yes
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
## 2023-09-05 NOTE — ANESTHESIA POSTPROCEDURE EVALUATION
Post-Op Assessment Note    CV Status:  Stable  Pain Score: 0    Pain management: adequate  Multimodal analgesia used between 6 hours prior to anesthesia start to PACU discharge    Post-procedure mental status: sedated. Hydration Status:  Stable   PONV Controlled:  None   Airway Patency:  Patent  Airway: intubated   Two or more mitigation strategies used for obstructive sleep apnea. There is a medical reason for not screening for obstructive sleep apnea and/or for not using two or more mitigation strategies   Post Op Vitals Reviewed: Yes      Staff: CRNA         No notable events documented.     BP   138/78   Temp  97.1   Pulse  97   Resp   16   SpO2   98
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
## 2023-09-05 NOTE — PROGRESS NOTES
Pastoral Care Progress Note    2023  Patient: Eva Mejias : 1990  Admission Date & Time: 2023 1632  MRN: [id number] CSN: [id number]                     Chaplaincy Interventions Utilized:   Empowerment: Clarified, confirmed, or reviewed information from treatment team     Exploration: Facilitated expression of regret    Relationship Building: Cultivated a relationship of care and support, Listened empathically, and Hospitality    Chaplaincy Outcomes Achieved:  Expressed intermediate hope and Identified meaningful connections    Friend, Celyeze Olivers, is present. Numbers of mother and brother are in pt phone. Will attempt to locate and notify family. Pt in OR. ED placed on lockdown.

## 2023-09-05 NOTE — ANESTHESIA PROCEDURE NOTES
Arterial Line Insertion    Performed by: Bhavna Colón MD  Authorized by: Bhavna Colón MD  Consent: The procedure was performed in an emergent situation. Verbal consent obtained. Written consent obtained. Consent given by: patient  Relevant documents: relevant documents present and verified  Site marked: the operative site was marked  Required items: required blood products, implants, devices, and special equipment available  Patient identity confirmed: verbally with patient and arm band  Time out: Immediately prior to procedure a "time out" was called to verify the correct patient, procedure, equipment, support staff and site/side marked as required. Preparation: Patient was prepped and draped in the usual sterile fashion.   Indications: multiple ABGs and hemodynamic monitoring  Orientation:  Right  Location: radial artery  Procedure Details:  Daniel's test normal: yes  Needle gauge: 20  Number of attempts: 1    Post-procedure:  Post-procedure: dressing applied  Waveform: good waveform  Post-procedure CNS: unchanged  Patient tolerance: patient tolerated the procedure well with no immediate complications

## 2023-09-06 ENCOUNTER — APPOINTMENT (INPATIENT)
Dept: RADIOLOGY | Facility: HOSPITAL | Age: 33
DRG: 952 | End: 2023-09-06
Payer: COMMERCIAL

## 2023-09-06 ENCOUNTER — APPOINTMENT (INPATIENT)
Dept: NON INVASIVE DIAGNOSTICS | Facility: HOSPITAL | Age: 33
DRG: 952 | End: 2023-09-06
Payer: COMMERCIAL

## 2023-09-06 LAB
ABO GROUP BLD BPU: NORMAL
ABO GROUP BLD BPU: NORMAL
ALBUMIN SERPL BCP-MCNC: 3.2 G/DL (ref 3.5–5)
ALP SERPL-CCNC: 29 U/L (ref 34–104)
ALT SERPL W P-5'-P-CCNC: 67 U/L (ref 7–52)
ANION GAP SERPL CALCULATED.3IONS-SCNC: 5 MMOL/L
AORTIC ROOT: 3.5 CM
AORTIC VALVE MEAN VELOCITY: 9.4 M/S
APICAL FOUR CHAMBER EJECTION FRACTION: 55 %
ASCENDING AORTA: 3.3 CM
AST SERPL W P-5'-P-CCNC: 78 U/L (ref 13–39)
AV AREA BY CONTINUOUS VTI: 2.8 CM2
AV AREA PEAK VELOCITY: 3 CM2
AV LVOT MEAN GRADIENT: 2 MMHG
AV LVOT PEAK GRADIENT: 4 MMHG
AV MEAN GRADIENT: 4 MMHG
AV PEAK GRADIENT: 8 MMHG
AV VALVE AREA: 2.81 CM2
AV VELOCITY RATIO: 0.72
BASE EXCESS BLDA CALC-SCNC: -0.3 MMOL/L
BASE EXCESS BLDA CALC-SCNC: -14 MMOL/L (ref -2–3)
BASOPHILS # BLD AUTO: 0.01 THOUSANDS/ÂΜL (ref 0–0.1)
BASOPHILS NFR BLD AUTO: 0 % (ref 0–1)
BILIRUB SERPL-MCNC: 0.73 MG/DL (ref 0.2–1)
BPU ID: NORMAL
BPU ID: NORMAL
BUN SERPL-MCNC: 9 MG/DL (ref 5–25)
CA-I BLD-SCNC: 1 MMOL/L (ref 1.12–1.32)
CA-I BLD-SCNC: 1.18 MMOL/L (ref 1.12–1.32)
CALCIUM ALBUM COR SERPL-MCNC: 7.9 MG/DL (ref 8.3–10.1)
CALCIUM SERPL-MCNC: 7.3 MG/DL (ref 8.4–10.2)
CFFMA (FUNCTIONAL FIBRINOGEN MAX AMPLITUDE): 13.9 MM (ref 15–32)
CHLORIDE SERPL-SCNC: 114 MMOL/L (ref 96–108)
CK SERPL-CCNC: 3055 U/L (ref 39–308)
CK SERPL-CCNC: 4526 U/L (ref 39–308)
CK SERPL-CCNC: 5662 U/L (ref 39–308)
CKLY30: 0.7 % (ref 0–2.6)
CKR(REACTION TIME): 6.2 MIN (ref 4.6–9.1)
CO2 SERPL-SCNC: 26 MMOL/L (ref 21–32)
CREAT SERPL-MCNC: 0.83 MG/DL (ref 0.6–1.3)
CROSSMATCH: NORMAL
CROSSMATCH: NORMAL
CRTMA(RAPIDTEG MAX AMPLITUDE): 51.1 MM (ref 52–70)
DOP CALC AO PEAK VEL: 1.38 M/S
DOP CALC AO VTI: 18.14 CM
DOP CALC LVOT AREA: 4.15 CM2
DOP CALC LVOT CARDIAC INDEX: 2.43 L/MIN/M2
DOP CALC LVOT CARDIAC OUTPUT: 5.22 L/MIN
DOP CALC LVOT DIAMETER: 2.3 CM
DOP CALC LVOT PEAK VEL VTI: 12.28 CM
DOP CALC LVOT PEAK VEL: 0.99 M/S
DOP CALC LVOT STROKE VOLUME: 50.99 CM3
E WAVE DECELERATION TIME: 110 MS
EOSINOPHIL # BLD AUTO: 0.07 THOUSAND/ÂΜL (ref 0–0.61)
EOSINOPHIL NFR BLD AUTO: 1 % (ref 0–6)
ERYTHROCYTE [DISTWIDTH] IN BLOOD BY AUTOMATED COUNT: 13.5 % (ref 11.6–15.1)
FRACTIONAL SHORTENING: 29 % (ref 28–44)
GFR SERPL CREATININE-BSD FRML MDRD: 115 ML/MIN/1.73SQ M
GLUCOSE SERPL-MCNC: 102 MG/DL (ref 65–140)
GLUCOSE SERPL-MCNC: 201 MG/DL (ref 65–140)
HCO3 BLDA-SCNC: 10.4 MMOL/L (ref 24–30)
HCO3 BLDA-SCNC: 24.5 MMOL/L (ref 22–28)
HCT VFR BLD AUTO: 30 % (ref 36.5–49.3)
HCT VFR BLD AUTO: 33.7 % (ref 36.5–49.3)
HCT VFR BLD CALC: 46 % (ref 36.5–49.3)
HGB BLD-MCNC: 10.6 G/DL (ref 12–17)
HGB BLD-MCNC: 11.6 G/DL (ref 12–17)
HGB BLD-MCNC: 8.5 G/DL (ref 12–17)
HGB BLD-MCNC: 8.8 G/DL (ref 12–17)
HGB BLD-MCNC: 9.4 G/DL (ref 12–17)
HGB BLDA-MCNC: 15.6 G/DL (ref 12–17)
IMM GRANULOCYTES # BLD AUTO: 0.03 THOUSAND/UL (ref 0–0.2)
IMM GRANULOCYTES NFR BLD AUTO: 0 % (ref 0–2)
INR PPP: 1.25 (ref 0.84–1.19)
INTERVENTRICULAR SEPTUM IN DIASTOLE (PARASTERNAL SHORT AXIS VIEW): 1.2 CM
INTERVENTRICULAR SEPTUM: 1.2 CM (ref 0.6–1.1)
LAAS-AP2: 18.1 CM2
LAAS-AP4: 13.7 CM2
LACTATE SERPL-SCNC: 1.5 MMOL/L (ref 0.5–2)
LEFT ATRIUM SIZE: 2.6 CM
LEFT ATRIUM VOLUME (MOD BIPLANE): 34 ML
LEFT INTERNAL DIMENSION IN SYSTOLE: 3.5 CM (ref 2.1–4)
LEFT VENTRICLE DIASTOLIC VOLUME (MOD BIPLANE): 163 ML
LEFT VENTRICLE SYSTOLIC VOLUME (MOD BIPLANE): 69 ML
LEFT VENTRICULAR INTERNAL DIMENSION IN DIASTOLE: 4.9 CM (ref 3.5–6)
LEFT VENTRICULAR POSTERIOR WALL IN END DIASTOLE: 1.2 CM
LEFT VENTRICULAR STROKE VOLUME: 59 ML
LV EF: 57 %
LVSV (TEICH): 59 ML
LYMPHOCYTES # BLD AUTO: 2.44 THOUSANDS/ÂΜL (ref 0.6–4.47)
LYMPHOCYTES NFR BLD AUTO: 25 % (ref 14–44)
MAGNESIUM SERPL-MCNC: 2.1 MG/DL (ref 1.9–2.7)
MCH RBC QN AUTO: 30.3 PG (ref 26.8–34.3)
MCHC RBC AUTO-ENTMCNC: 35.3 G/DL (ref 31.4–37.4)
MCV RBC AUTO: 86 FL (ref 82–98)
MONOCYTES # BLD AUTO: 1.07 THOUSAND/ÂΜL (ref 0.17–1.22)
MONOCYTES NFR BLD AUTO: 11 % (ref 4–12)
MV E'TISSUE VEL-LAT: 9 CM/S
MV E'TISSUE VEL-SEP: 11 CM/S
MV PEAK A VEL: 0.93 M/S
MV PEAK E VEL: 75 CM/S
MV STENOSIS PRESSURE HALF TIME: 32 MS
MV VALVE AREA P 1/2 METHOD: 6.88 CM2
NEUTROPHILS # BLD AUTO: 6.24 THOUSANDS/ÂΜL (ref 1.85–7.62)
NEUTS SEG NFR BLD AUTO: 63 % (ref 43–75)
NRBC BLD AUTO-RTO: 0 /100 WBCS
O2 CT BLDA-SCNC: 17.7 ML/DL (ref 16–23)
OXYHGB MFR BLDA: 97.9 % (ref 94–97)
PCO2 BLD: 11 MMOL/L (ref 21–32)
PCO2 BLD: 23.2 MM HG (ref 42–50)
PCO2 BLDA: 40.9 MM HG (ref 36–44)
PH BLD: 7.26 [PH] (ref 7.3–7.4)
PH BLDA: 7.4 [PH] (ref 7.35–7.45)
PHOSPHATE SERPL-MCNC: 3.4 MG/DL (ref 2.7–4.5)
PLATELET # BLD AUTO: 130 THOUSANDS/UL (ref 149–390)
PMV BLD AUTO: 9.5 FL (ref 8.9–12.7)
PO2 BLD: 67 MM HG (ref 35–45)
PO2 BLDA: 190.5 MM HG (ref 75–129)
POTASSIUM BLD-SCNC: 3.1 MMOL/L (ref 3.5–5.3)
POTASSIUM SERPL-SCNC: 3.9 MMOL/L (ref 3.5–5.3)
PROT SERPL-MCNC: 4.5 G/DL (ref 6.4–8.4)
PROTHROMBIN TIME: 15.9 SECONDS (ref 11.6–14.5)
RBC # BLD AUTO: 3.5 MILLION/UL (ref 3.88–5.62)
RIGHT ATRIAL 2D VOLUME: 55 ML
RIGHT ATRIUM AREA SYSTOLE A4C: 18 CM2
RIGHT VENTRICLE ID DIMENSION: 4.9 CM
SAO2 % BLD FROM PO2: 91 % (ref 60–85)
SIMV VENT INSPIRED AIR FIO2: 40
SIMV VENT PEEP: 6
SIMV VENT TIDAL VOLUME: 500
SIMV VENT: ABNORMAL
SINOTUBULAR JUNCTION: 2.9 CM
SL CV LEFT ATRIUM LENGTH A2C: 5.5 CM
SL CV LV EF: 70
SL CV PED ECHO LEFT VENTRICLE DIASTOLIC VOLUME (MOD BIPLANE) 2D: 112 ML
SL CV PED ECHO LEFT VENTRICLE SYSTOLIC VOLUME (MOD BIPLANE) 2D: 52 ML
SODIUM BLD-SCNC: 146 MMOL/L (ref 136–145)
SODIUM SERPL-SCNC: 145 MMOL/L (ref 135–147)
SPECIMEN SOURCE: ABNORMAL
SPECIMEN SOURCE: ABNORMAL
STJ: 2.9 CM
TR MAX PG: 32 MMHG
TR PEAK VELOCITY: 2.9 M/S
TRICUSPID VALVE PEAK REGURGITATION VELOCITY: 2.85 M/S
UNIT DISPENSE STATUS: NORMAL
UNIT DISPENSE STATUS: NORMAL
UNIT PRODUCT CODE: NORMAL
UNIT PRODUCT CODE: NORMAL
UNIT PRODUCT VOLUME: 350 ML
UNIT PRODUCT VOLUME: 350 ML
UNIT RH: NORMAL
UNIT RH: NORMAL
WBC # BLD AUTO: 9.86 THOUSAND/UL (ref 4.31–10.16)

## 2023-09-06 PROCEDURE — 83605 ASSAY OF LACTIC ACID: CPT | Performed by: PHYSICIAN ASSISTANT

## 2023-09-06 PROCEDURE — NC001 PR NO CHARGE: Performed by: STUDENT IN AN ORGANIZED HEALTH CARE EDUCATION/TRAINING PROGRAM

## 2023-09-06 PROCEDURE — 93306 TTE W/DOPPLER COMPLETE: CPT | Performed by: INTERNAL MEDICINE

## 2023-09-06 PROCEDURE — 85384 FIBRINOGEN ACTIVITY: CPT

## 2023-09-06 PROCEDURE — 85025 COMPLETE CBC W/AUTO DIFF WBC: CPT | Performed by: PHYSICIAN ASSISTANT

## 2023-09-06 PROCEDURE — 85347 COAGULATION TIME ACTIVATED: CPT

## 2023-09-06 PROCEDURE — 73090 X-RAY EXAM OF FOREARM: CPT

## 2023-09-06 PROCEDURE — 85018 HEMOGLOBIN: CPT | Performed by: NURSE PRACTITIONER

## 2023-09-06 PROCEDURE — 71045 X-RAY EXAM CHEST 1 VIEW: CPT

## 2023-09-06 PROCEDURE — 85610 PROTHROMBIN TIME: CPT | Performed by: PHYSICIAN ASSISTANT

## 2023-09-06 PROCEDURE — 82550 ASSAY OF CK (CPK): CPT | Performed by: NURSE PRACTITIONER

## 2023-09-06 PROCEDURE — 85014 HEMATOCRIT: CPT | Performed by: PHYSICIAN ASSISTANT

## 2023-09-06 PROCEDURE — 99024 POSTOP FOLLOW-UP VISIT: CPT | Performed by: STUDENT IN AN ORGANIZED HEALTH CARE EDUCATION/TRAINING PROGRAM

## 2023-09-06 PROCEDURE — 83735 ASSAY OF MAGNESIUM: CPT | Performed by: PHYSICIAN ASSISTANT

## 2023-09-06 PROCEDURE — 84100 ASSAY OF PHOSPHORUS: CPT | Performed by: PHYSICIAN ASSISTANT

## 2023-09-06 PROCEDURE — 85576 BLOOD PLATELET AGGREGATION: CPT

## 2023-09-06 PROCEDURE — 82330 ASSAY OF CALCIUM: CPT | Performed by: PHYSICIAN ASSISTANT

## 2023-09-06 PROCEDURE — 0RSJXZZ REPOSITION RIGHT SHOULDER JOINT, EXTERNAL APPROACH: ICD-10-PCS | Performed by: STUDENT IN AN ORGANIZED HEALTH CARE EDUCATION/TRAINING PROGRAM

## 2023-09-06 PROCEDURE — 94760 N-INVAS EAR/PLS OXIMETRY 1: CPT

## 2023-09-06 PROCEDURE — 23650 CLTX SHO DSLC W/MNPJ WO ANES: CPT | Performed by: STUDENT IN AN ORGANIZED HEALTH CARE EDUCATION/TRAINING PROGRAM

## 2023-09-06 PROCEDURE — 85018 HEMOGLOBIN: CPT

## 2023-09-06 PROCEDURE — 99024 POSTOP FOLLOW-UP VISIT: CPT | Performed by: SURGERY

## 2023-09-06 PROCEDURE — 73020 X-RAY EXAM OF SHOULDER: CPT

## 2023-09-06 PROCEDURE — 85018 HEMOGLOBIN: CPT | Performed by: PHYSICIAN ASSISTANT

## 2023-09-06 PROCEDURE — 93306 TTE W/DOPPLER COMPLETE: CPT

## 2023-09-06 PROCEDURE — 99291 CRITICAL CARE FIRST HOUR: CPT | Performed by: STUDENT IN AN ORGANIZED HEALTH CARE EDUCATION/TRAINING PROGRAM

## 2023-09-06 PROCEDURE — 82805 BLOOD GASES W/O2 SATURATION: CPT | Performed by: PHYSICIAN ASSISTANT

## 2023-09-06 PROCEDURE — 85397 CLOTTING FUNCT ACTIVITY: CPT

## 2023-09-06 PROCEDURE — 99024 POSTOP FOLLOW-UP VISIT: CPT | Performed by: UROLOGY

## 2023-09-06 PROCEDURE — 82550 ASSAY OF CK (CPK): CPT | Performed by: PHYSICIAN ASSISTANT

## 2023-09-06 PROCEDURE — 94003 VENT MGMT INPAT SUBQ DAY: CPT

## 2023-09-06 PROCEDURE — 94664 DEMO&/EVAL PT USE INHALER: CPT

## 2023-09-06 PROCEDURE — 80053 COMPREHEN METABOLIC PANEL: CPT | Performed by: PHYSICIAN ASSISTANT

## 2023-09-06 RX ORDER — FUROSEMIDE 10 MG/ML
20 INJECTION INTRAMUSCULAR; INTRAVENOUS ONCE
Status: COMPLETED | OUTPATIENT
Start: 2023-09-06 | End: 2023-09-06

## 2023-09-06 RX ORDER — FENTANYL CITRATE 50 UG/ML
50 INJECTION, SOLUTION INTRAMUSCULAR; INTRAVENOUS ONCE
Status: DISCONTINUED | OUTPATIENT
Start: 2023-09-06 | End: 2023-09-06

## 2023-09-06 RX ORDER — HYDROMORPHONE HCL/PF 1 MG/ML
1 SYRINGE (ML) INJECTION ONCE
Status: DISCONTINUED | OUTPATIENT
Start: 2023-09-06 | End: 2023-09-06

## 2023-09-06 RX ORDER — HYDROMORPHONE HCL/PF 1 MG/ML
1 SYRINGE (ML) INJECTION ONCE
Status: COMPLETED | OUTPATIENT
Start: 2023-09-06 | End: 2023-09-06

## 2023-09-06 RX ORDER — ONDANSETRON 2 MG/ML
4 INJECTION INTRAMUSCULAR; INTRAVENOUS EVERY 6 HOURS PRN
Status: DISCONTINUED | OUTPATIENT
Start: 2023-09-06 | End: 2023-09-15 | Stop reason: HOSPADM

## 2023-09-06 RX ORDER — CALCIUM GLUCONATE 20 MG/ML
2 INJECTION, SOLUTION INTRAVENOUS ONCE
Status: COMPLETED | OUTPATIENT
Start: 2023-09-06 | End: 2023-09-06

## 2023-09-06 RX ORDER — SODIUM CHLORIDE, SODIUM GLUCONATE, SODIUM ACETATE, POTASSIUM CHLORIDE, MAGNESIUM CHLORIDE, SODIUM PHOSPHATE, DIBASIC, AND POTASSIUM PHOSPHATE .53; .5; .37; .037; .03; .012; .00082 G/100ML; G/100ML; G/100ML; G/100ML; G/100ML; G/100ML; G/100ML
1000 INJECTION, SOLUTION INTRAVENOUS ONCE
Status: COMPLETED | OUTPATIENT
Start: 2023-09-06 | End: 2023-09-07

## 2023-09-06 RX ORDER — FENTANYL CITRATE 50 UG/ML
50 INJECTION, SOLUTION INTRAMUSCULAR; INTRAVENOUS ONCE
Status: COMPLETED | OUTPATIENT
Start: 2023-09-06 | End: 2023-09-06

## 2023-09-06 RX ADMIN — SODIUM CHLORIDE, SODIUM GLUCONATE, SODIUM ACETATE, POTASSIUM CHLORIDE, MAGNESIUM CHLORIDE, SODIUM PHOSPHATE, DIBASIC, AND POTASSIUM PHOSPHATE 1000 ML: .53; .5; .37; .037; .03; .012; .00082 INJECTION, SOLUTION INTRAVENOUS at 23:22

## 2023-09-06 RX ADMIN — FENTANYL CITRATE 50 MCG: 50 INJECTION INTRAMUSCULAR; INTRAVENOUS at 13:49

## 2023-09-06 RX ADMIN — ONDANSETRON 4 MG: 2 INJECTION INTRAMUSCULAR; INTRAVENOUS at 19:32

## 2023-09-06 RX ADMIN — CHLORHEXIDINE GLUCONATE 15 ML: 1.2 SOLUTION ORAL at 21:39

## 2023-09-06 RX ADMIN — PROPOFOL 50 MCG/KG/MIN: 10 INJECTION, EMULSION INTRAVENOUS at 07:09

## 2023-09-06 RX ADMIN — FENTANYL CITRATE 50 MCG: 50 INJECTION INTRAMUSCULAR; INTRAVENOUS at 09:01

## 2023-09-06 RX ADMIN — SODIUM CHLORIDE, SODIUM GLUCONATE, SODIUM ACETATE, POTASSIUM CHLORIDE, MAGNESIUM CHLORIDE, SODIUM PHOSPHATE, DIBASIC, AND POTASSIUM PHOSPHATE 250 ML/HR: .53; .5; .37; .037; .03; .012; .00082 INJECTION, SOLUTION INTRAVENOUS at 16:16

## 2023-09-06 RX ADMIN — SODIUM CHLORIDE, SODIUM GLUCONATE, SODIUM ACETATE, POTASSIUM CHLORIDE, MAGNESIUM CHLORIDE, SODIUM PHOSPHATE, DIBASIC, AND POTASSIUM PHOSPHATE 250 ML/HR: .53; .5; .37; .037; .03; .012; .00082 INJECTION, SOLUTION INTRAVENOUS at 07:49

## 2023-09-06 RX ADMIN — FENTANYL CITRATE 50 MCG: 50 INJECTION INTRAMUSCULAR; INTRAVENOUS at 15:38

## 2023-09-06 RX ADMIN — FUROSEMIDE 20 MG: 10 INJECTION, SOLUTION INTRAMUSCULAR; INTRAVENOUS at 23:21

## 2023-09-06 RX ADMIN — SENNOSIDES AND DOCUSATE SODIUM 2 TABLET: 50; 8.6 TABLET ORAL at 18:30

## 2023-09-06 RX ADMIN — FENTANYL CITRATE 50 MCG: 50 INJECTION INTRAMUSCULAR; INTRAVENOUS at 06:34

## 2023-09-06 RX ADMIN — PROPOFOL 50 MCG/KG/MIN: 10 INJECTION, EMULSION INTRAVENOUS at 11:27

## 2023-09-06 RX ADMIN — Medication: at 14:05

## 2023-09-06 RX ADMIN — FENTANYL CITRATE 50 MCG: 50 INJECTION INTRAMUSCULAR; INTRAVENOUS at 02:06

## 2023-09-06 RX ADMIN — FENTANYL CITRATE 50 MCG: 50 INJECTION INTRAMUSCULAR; INTRAVENOUS at 00:53

## 2023-09-06 RX ADMIN — CALCIUM GLUCONATE 2 G: 20 INJECTION, SOLUTION INTRAVENOUS at 08:06

## 2023-09-06 RX ADMIN — HYDROMORPHONE HYDROCHLORIDE 1 MG: 1 INJECTION, SOLUTION INTRAMUSCULAR; INTRAVENOUS; SUBCUTANEOUS at 00:47

## 2023-09-06 RX ADMIN — FENTANYL CITRATE 50 MCG: 50 INJECTION INTRAMUSCULAR; INTRAVENOUS at 21:39

## 2023-09-06 RX ADMIN — CHLORHEXIDINE GLUCONATE 15 ML: 1.2 SOLUTION ORAL at 08:30

## 2023-09-06 RX ADMIN — SODIUM CHLORIDE, SODIUM GLUCONATE, SODIUM ACETATE, POTASSIUM CHLORIDE, MAGNESIUM CHLORIDE, SODIUM PHOSPHATE, DIBASIC, AND POTASSIUM PHOSPHATE 250 ML/HR: .53; .5; .37; .037; .03; .012; .00082 INJECTION, SOLUTION INTRAVENOUS at 12:02

## 2023-09-06 RX ADMIN — SENNOSIDES AND DOCUSATE SODIUM 2 TABLET: 50; 8.6 TABLET ORAL at 08:30

## 2023-09-06 RX ADMIN — FENTANYL CITRATE 50 MCG: 50 INJECTION INTRAMUSCULAR; INTRAVENOUS at 00:08

## 2023-09-06 RX ADMIN — PROPOFOL 50 MCG/KG/MIN: 10 INJECTION, EMULSION INTRAVENOUS at 03:15

## 2023-09-06 RX ADMIN — Medication 100 MCG/HR: at 04:02

## 2023-09-06 RX ADMIN — SODIUM CHLORIDE, SODIUM GLUCONATE, SODIUM ACETATE, POTASSIUM CHLORIDE, MAGNESIUM CHLORIDE, SODIUM PHOSPHATE, DIBASIC, AND POTASSIUM PHOSPHATE 250 ML/HR: .53; .5; .37; .037; .03; .012; .00082 INJECTION, SOLUTION INTRAVENOUS at 03:30

## 2023-09-06 RX ADMIN — FENTANYL CITRATE 50 MCG: 50 INJECTION INTRAMUSCULAR; INTRAVENOUS at 01:02

## 2023-09-06 RX ADMIN — PROPOFOL 50 MCG/KG/MIN: 10 INJECTION, EMULSION INTRAVENOUS at 00:02

## 2023-09-06 NOTE — QUICK NOTE
Dressing Change Note:    Ace bandages, heavy drainage pads and dressings were removed from the L medial thigh. The wound was inspected. No pulsatile or arterial bleeding was noted. Gentle venous oozing was noted deep within the midline of the wound. x4 surgical strips were then placed. Saline soaked kerlex was applied covered with gauze, heavy drainage packs and ABDs. The thigh was wrapped with x2 dry kerlex and x2 Ace bandages.     Lazarus Law MD  PGY-1
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
## 2023-09-06 NOTE — QUICK NOTE
Dressing Change:    Notified by ICU team that L thigh dressings soaked through ace wraps. Pt awake and alert while evaluated at bedside. Removed ace wrap, kerlex, guaze, abd and heavy dressing pads. Did not remove kerlex that was packed deeper into wound. Reapplied guaze x3 heavy drainage pads, x3 abd pads, x3 kerlex wraps. Applied x2 ace wraps and secured with tape.        Gayathri Calero MD  PGY-1

## 2023-09-06 NOTE — ASSESSMENT & PLAN NOTE
· POD #1 cystoscopy, removal of foreign body, vascular surgery evaluation and left thigh cleanout with placement of packing  · Vital signs stable, afebrile  · Alicea catheter with clear yellow urine  · Creat 0.83  · WBC 9.86  · Hgb 10.6  · Medical optimization dressing changes per primary surgical team  · Urology will take down penile dressing on 9/7/2023
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
## 2023-09-06 NOTE — PROCEDURES
Orthopedic injury treatment    Date/Time: 9/6/2023 11:45 AM    Performed by: Wally Shell DO  Authorized by: Wally Shell DO    Patient location: ICU. Universal Protocol:  Consent: The procedure was performed in an emergent situation. Verbal consent not obtained. Written consent not obtained. Patient identity confirmed: arm band      Injury location:  Shoulder  Location details:  Right shoulder  Injury type:  Dislocation  Dislocation type: inferior    Chronicity:  New  Hill-Sachs deformity?: No    Neurovascular status: Neurovascularly intact    Distal perfusion: normal    Neurological function: normal    Range of motion: reduced    Local anesthesia used?: No    General anesthesia used?: No    Immobilization:  Sling  Distal perfusion: normal    Neurological function: normal    Range of motion: normal     Pt already intubated and sedated with propofol and fentanyl. A small propofol bolus (50mg) was given to facilitate reduction. There were no adverse events kirit-procedure.
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
## 2023-09-06 NOTE — PLAN OF CARE
Problem: Prexisting or High Potential for Compromised Skin Integrity  Goal: Skin integrity is maintained or improved  Description: INTERVENTIONS:  - Identify patients at risk for skin breakdown  - Assess and monitor skin integrity  - Assess and monitor nutrition and hydration status  - Monitor labs   - Assess for incontinence   - Turn and reposition patient  - Assist with mobility/ambulation  - Relieve pressure over bony prominences  - Avoid friction and shearing  - Provide appropriate hygiene as needed including keeping skin clean and dry  - Evaluate need for skin moisturizer/barrier cream  - Collaborate with interdisciplinary team   - Patient/family teaching  - Consider wound care consult   Outcome: Progressing     Problem: MOBILITY - ADULT  Goal: Maintain or return to baseline ADL function  Description: INTERVENTIONS:  -  Assess patient's ability to carry out ADLs; assess patient's baseline for ADL function and identify physical deficits which impact ability to perform ADLs (bathing, care of mouth/teeth, toileting, grooming, dressing, etc.)  - Assess/evaluate cause of self-care deficits   - Assess range of motion  - Assess patient's mobility; develop plan if impaired  - Assess patient's need for assistive devices and provide as appropriate  - Encourage maximum independence but intervene and supervise when necessary  - Involve family in performance of ADLs  - Assess for home care needs following discharge   - Consider OT consult to assist with ADL evaluation and planning for discharge  - Provide patient education as appropriate  9/6/2023 1447 by Anahi Malagon RN  Outcome: Progressing  9/6/2023 1444 by Anahi Malagon RN  Outcome: Progressing     Problem: SAFETY,RESTRAINT: NV/NON-SELF DESTRUCTIVE BEHAVIOR  Goal: Remains free of harm/injury (restraint for non violent/non self-detsructive behavior)  Description: INTERVENTIONS:  - Instruct patient/family regarding restraint use   - Assess and monitor physiologic and psychological status   - Provide interventions and comfort measures to meet assessed patient needs   - Identify and implement measures to help patient regain control  - Assess readiness for release of restraint   Outcome: Progressing     Problem: PAIN - ADULT  Goal: Verbalizes/displays adequate comfort level or baseline comfort level  Description: Interventions:  - Encourage patient to monitor pain and request assistance  - Assess pain using appropriate pain scale  - Administer analgesics based on type and severity of pain and evaluate response  - Implement non-pharmacological measures as appropriate and evaluate response  - Consider cultural and social influences on pain and pain management  - Notify physician/advanced practitioner if interventions unsuccessful or patient reports new pain  Outcome: Progressing     Problem: INFECTION - ADULT  Goal: Absence or prevention of progression during hospitalization  Description: INTERVENTIONS:  - Assess and monitor for signs and symptoms of infection  - Monitor lab/diagnostic results  - Monitor all insertion sites, i.e. indwelling lines, tubes, and drains  - Monitor endotracheal if appropriate and nasal secretions for changes in amount and color  - [place] appropriate cooling/warming therapies per order  - Administer medications as ordered  - Instruct and encourage patient and family to use good hand hygiene technique  - Identify and instruct in appropriate isolation precautions for identified infection/condition  Outcome: Progressing     Problem: SAFETY ADULT  Goal: Maintain or return to baseline ADL function  Description: INTERVENTIONS:  -  Assess patient's ability to carry out ADLs; assess patient's baseline for ADL function and identify physical deficits which impact ability to perform ADLs (bathing, care of mouth/teeth, toileting, grooming, dressing, etc.)  - Assess/evaluate cause of self-care deficits   - Assess range of motion  - Assess patient's mobility; develop plan if impaired  - Assess patient's need for assistive devices and provide as appropriate  - Encourage maximum independence but intervene and supervise when necessary  - Involve family in performance of ADLs  - Assess for home care needs following discharge   - Consider OT consult to assist with ADL evaluation and planning for discharge  - Provide patient education as appropriate  9/6/2023 1447 by Francisco Rich RN  Outcome: Progressing  9/6/2023 1444 by Francisco Rich RN  Outcome: Progressing
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
## 2023-09-07 ENCOUNTER — APPOINTMENT (INPATIENT)
Dept: RADIOLOGY | Facility: HOSPITAL | Age: 33
DRG: 952 | End: 2023-09-07
Payer: COMMERCIAL

## 2023-09-07 PROBLEM — S31.24XA: Status: ACTIVE | Noted: 2023-09-07

## 2023-09-07 PROBLEM — D62 ACUTE BLOOD LOSS ANEMIA: Status: ACTIVE | Noted: 2023-09-07

## 2023-09-07 PROBLEM — I46.9 CARDIAC ARREST (HCC): Status: ACTIVE | Noted: 2023-09-07

## 2023-09-07 PROBLEM — M62.82 RHABDOMYOLYSIS: Status: ACTIVE | Noted: 2023-09-07

## 2023-09-07 PROBLEM — S43.004A CLOSED DISLOCATION OF RIGHT SHOULDER: Status: ACTIVE | Noted: 2023-09-07

## 2023-09-07 LAB
ALBUMIN SERPL BCP-MCNC: 3 G/DL (ref 3.5–5)
ALP SERPL-CCNC: 27 U/L (ref 34–104)
ALT SERPL W P-5'-P-CCNC: 69 U/L (ref 7–52)
ANION GAP SERPL CALCULATED.3IONS-SCNC: 5 MMOL/L
AST SERPL W P-5'-P-CCNC: 86 U/L (ref 13–39)
BILIRUB SERPL-MCNC: 0.77 MG/DL (ref 0.2–1)
BUN SERPL-MCNC: 4 MG/DL (ref 5–25)
CALCIUM ALBUM COR SERPL-MCNC: 8 MG/DL (ref 8.3–10.1)
CALCIUM SERPL-MCNC: 7.2 MG/DL (ref 8.4–10.2)
CHLORIDE SERPL-SCNC: 109 MMOL/L (ref 96–108)
CK SERPL-CCNC: 4689 U/L (ref 39–308)
CO2 SERPL-SCNC: 29 MMOL/L (ref 21–32)
CREAT SERPL-MCNC: 0.65 MG/DL (ref 0.6–1.3)
ERYTHROCYTE [DISTWIDTH] IN BLOOD BY AUTOMATED COUNT: 14.1 % (ref 11.6–15.1)
GFR SERPL CREATININE-BSD FRML MDRD: 127 ML/MIN/1.73SQ M
GLUCOSE SERPL-MCNC: 138 MG/DL (ref 65–140)
HCT VFR BLD AUTO: 23.2 % (ref 36.5–49.3)
HGB BLD-MCNC: 7.9 G/DL (ref 12–17)
MAGNESIUM SERPL-MCNC: 1.9 MG/DL (ref 1.9–2.7)
MCH RBC QN AUTO: 30 PG (ref 26.8–34.3)
MCHC RBC AUTO-ENTMCNC: 34.1 G/DL (ref 31.4–37.4)
MCV RBC AUTO: 88 FL (ref 82–98)
PHOSPHATE SERPL-MCNC: 2.1 MG/DL (ref 2.7–4.5)
PLATELET # BLD AUTO: 116 THOUSANDS/UL (ref 149–390)
PMV BLD AUTO: 9.8 FL (ref 8.9–12.7)
POTASSIUM SERPL-SCNC: 3.9 MMOL/L (ref 3.5–5.3)
PROT SERPL-MCNC: 4.6 G/DL (ref 6.4–8.4)
RBC # BLD AUTO: 2.63 MILLION/UL (ref 3.88–5.62)
SODIUM SERPL-SCNC: 143 MMOL/L (ref 135–147)
WBC # BLD AUTO: 11.11 THOUSAND/UL (ref 4.31–10.16)

## 2023-09-07 PROCEDURE — NC001 PR NO CHARGE: Performed by: NURSE PRACTITIONER

## 2023-09-07 PROCEDURE — 73030 X-RAY EXAM OF SHOULDER: CPT

## 2023-09-07 PROCEDURE — NC001 PR NO CHARGE: Performed by: ORTHOPAEDIC SURGERY

## 2023-09-07 PROCEDURE — 99254 IP/OBS CNSLTJ NEW/EST MOD 60: CPT | Performed by: ORTHOPAEDIC SURGERY

## 2023-09-07 PROCEDURE — 85027 COMPLETE CBC AUTOMATED: CPT | Performed by: NURSE PRACTITIONER

## 2023-09-07 PROCEDURE — 97167 OT EVAL HIGH COMPLEX 60 MIN: CPT

## 2023-09-07 PROCEDURE — 99024 POSTOP FOLLOW-UP VISIT: CPT | Performed by: STUDENT IN AN ORGANIZED HEALTH CARE EDUCATION/TRAINING PROGRAM

## 2023-09-07 PROCEDURE — 97163 PT EVAL HIGH COMPLEX 45 MIN: CPT

## 2023-09-07 PROCEDURE — 83735 ASSAY OF MAGNESIUM: CPT | Performed by: NURSE PRACTITIONER

## 2023-09-07 PROCEDURE — 84100 ASSAY OF PHOSPHORUS: CPT | Performed by: NURSE PRACTITIONER

## 2023-09-07 PROCEDURE — 82550 ASSAY OF CK (CPK): CPT | Performed by: NURSE PRACTITIONER

## 2023-09-07 PROCEDURE — 80053 COMPREHEN METABOLIC PANEL: CPT | Performed by: NURSE PRACTITIONER

## 2023-09-07 PROCEDURE — 99233 SBSQ HOSP IP/OBS HIGH 50: CPT | Performed by: STUDENT IN AN ORGANIZED HEALTH CARE EDUCATION/TRAINING PROGRAM

## 2023-09-07 RX ORDER — ACETAMINOPHEN 325 MG/1
975 TABLET ORAL EVERY 8 HOURS SCHEDULED
Status: DISCONTINUED | OUTPATIENT
Start: 2023-09-07 | End: 2023-09-15 | Stop reason: HOSPADM

## 2023-09-07 RX ORDER — POTASSIUM CHLORIDE 20 MEQ/1
40 TABLET, EXTENDED RELEASE ORAL ONCE
Status: COMPLETED | OUTPATIENT
Start: 2023-09-07 | End: 2023-09-07

## 2023-09-07 RX ORDER — METHOCARBAMOL 500 MG/1
500 TABLET, FILM COATED ORAL EVERY 6 HOURS SCHEDULED
Status: DISCONTINUED | OUTPATIENT
Start: 2023-09-07 | End: 2023-09-15 | Stop reason: HOSPADM

## 2023-09-07 RX ORDER — GINSENG 100 MG
1 CAPSULE ORAL 2 TIMES DAILY
Status: DISCONTINUED | OUTPATIENT
Start: 2023-09-07 | End: 2023-09-15 | Stop reason: HOSPADM

## 2023-09-07 RX ORDER — ENOXAPARIN SODIUM 100 MG/ML
30 INJECTION SUBCUTANEOUS EVERY 12 HOURS SCHEDULED
Status: DISCONTINUED | OUTPATIENT
Start: 2023-09-07 | End: 2023-09-15 | Stop reason: HOSPADM

## 2023-09-07 RX ORDER — CEFAZOLIN SODIUM 1 G/50ML
1000 SOLUTION INTRAVENOUS
Status: DISCONTINUED | OUTPATIENT
Start: 2023-09-08 | End: 2023-09-07

## 2023-09-07 RX ORDER — CALCIUM GLUCONATE 20 MG/ML
2 INJECTION, SOLUTION INTRAVENOUS ONCE
Status: COMPLETED | OUTPATIENT
Start: 2023-09-07 | End: 2023-09-07

## 2023-09-07 RX ADMIN — ACETAMINOPHEN 975 MG: 325 TABLET, FILM COATED ORAL at 05:57

## 2023-09-07 RX ADMIN — FENTANYL CITRATE 50 MCG: 50 INJECTION INTRAMUSCULAR; INTRAVENOUS at 00:07

## 2023-09-07 RX ADMIN — CALCIUM GLUCONATE 2 G: 20 INJECTION, SOLUTION INTRAVENOUS at 08:00

## 2023-09-07 RX ADMIN — BACITRACIN ZINC 1 SMALL APPLICATION: 500 OINTMENT TOPICAL at 15:28

## 2023-09-07 RX ADMIN — METHOCARBAMOL 500 MG: 500 TABLET ORAL at 18:29

## 2023-09-07 RX ADMIN — METHOCARBAMOL 500 MG: 500 TABLET ORAL at 05:58

## 2023-09-07 RX ADMIN — DIBASIC SODIUM PHOSPHATE, MONOBASIC POTASSIUM PHOSPHATE AND MONOBASIC SODIUM PHOSPHATE 2 TABLET: 852; 155; 130 TABLET ORAL at 12:33

## 2023-09-07 RX ADMIN — ENOXAPARIN SODIUM 30 MG: 30 INJECTION SUBCUTANEOUS at 10:57

## 2023-09-07 RX ADMIN — ENOXAPARIN SODIUM 30 MG: 30 INJECTION SUBCUTANEOUS at 21:16

## 2023-09-07 RX ADMIN — ACETAMINOPHEN 975 MG: 325 TABLET, FILM COATED ORAL at 21:16

## 2023-09-07 RX ADMIN — SENNOSIDES AND DOCUSATE SODIUM 2 TABLET: 50; 8.6 TABLET ORAL at 08:00

## 2023-09-07 RX ADMIN — METHOCARBAMOL 500 MG: 500 TABLET ORAL at 12:33

## 2023-09-07 RX ADMIN — CHLORHEXIDINE GLUCONATE 15 ML: 1.2 SOLUTION ORAL at 08:00

## 2023-09-07 RX ADMIN — SODIUM CHLORIDE, SODIUM GLUCONATE, SODIUM ACETATE, POTASSIUM CHLORIDE, MAGNESIUM CHLORIDE, SODIUM PHOSPHATE, DIBASIC, AND POTASSIUM PHOSPHATE 250 ML/HR: .53; .5; .37; .037; .03; .012; .00082 INJECTION, SOLUTION INTRAVENOUS at 00:08

## 2023-09-07 RX ADMIN — SENNOSIDES AND DOCUSATE SODIUM 2 TABLET: 50; 8.6 TABLET ORAL at 18:29

## 2023-09-07 RX ADMIN — DIBASIC SODIUM PHOSPHATE, MONOBASIC POTASSIUM PHOSPHATE AND MONOBASIC SODIUM PHOSPHATE 2 TABLET: 852; 155; 130 TABLET ORAL at 08:00

## 2023-09-07 RX ADMIN — ONDANSETRON 4 MG: 2 INJECTION INTRAMUSCULAR; INTRAVENOUS at 12:38

## 2023-09-07 RX ADMIN — DIBASIC SODIUM PHOSPHATE, MONOBASIC POTASSIUM PHOSPHATE AND MONOBASIC SODIUM PHOSPHATE 2 TABLET: 852; 155; 130 TABLET ORAL at 15:30

## 2023-09-07 RX ADMIN — POTASSIUM CHLORIDE 40 MEQ: 1500 TABLET, EXTENDED RELEASE ORAL at 01:56

## 2023-09-07 RX ADMIN — ACETAMINOPHEN 975 MG: 325 TABLET, FILM COATED ORAL at 15:28

## 2023-09-07 RX ADMIN — DIBASIC SODIUM PHOSPHATE, MONOBASIC POTASSIUM PHOSPHATE AND MONOBASIC SODIUM PHOSPHATE 2 TABLET: 852; 155; 130 TABLET ORAL at 21:16

## 2023-09-07 RX ADMIN — SODIUM CHLORIDE, SODIUM GLUCONATE, SODIUM ACETATE, POTASSIUM CHLORIDE, MAGNESIUM CHLORIDE, SODIUM PHOSPHATE, DIBASIC, AND POTASSIUM PHOSPHATE 250 ML/HR: .53; .5; .37; .037; .03; .012; .00082 INJECTION, SOLUTION INTRAVENOUS at 04:03

## 2023-09-07 NOTE — PLAN OF CARE
Problem: Prexisting or High Potential for Compromised Skin Integrity  Goal: Skin integrity is maintained or improved  Description: INTERVENTIONS:  - Identify patients at risk for skin breakdown  - Assess and monitor skin integrity  - Assess and monitor nutrition and hydration status  - Monitor labs   - Assess for incontinence   - Turn and reposition patient  - Assist with mobility/ambulation  - Relieve pressure over bony prominences  - Avoid friction and shearing  - Provide appropriate hygiene as needed including keeping skin clean and dry  - Evaluate need for skin moisturizer/barrier cream  - Collaborate with interdisciplinary team   - Patient/family teaching  - Consider wound care consult   Outcome: Progressing     Problem: MOBILITY - ADULT  Goal: Maintain or return to baseline ADL function  Description: INTERVENTIONS:  -  Assess patient's ability to carry out ADLs; assess patient's baseline for ADL function and identify physical deficits which impact ability to perform ADLs (bathing, care of mouth/teeth, toileting, grooming, dressing, etc.)  - Assess/evaluate cause of self-care deficits   - Assess range of motion  - Assess patient's mobility; develop plan if impaired  - Assess patient's need for assistive devices and provide as appropriate  - Encourage maximum independence but intervene and supervise when necessary  - Involve family in performance of ADLs  - Assess for home care needs following discharge   - Consider OT consult to assist with ADL evaluation and planning for discharge  - Provide patient education as appropriate  Outcome: Progressing  Goal: Maintains/Returns to pre admission functional level  Description: INTERVENTIONS:  - Perform BMAT or MOVE assessment daily.   - Set and communicate daily mobility goal to care team and patient/family/caregiver. - Collaborate with rehabilitation services on mobility goals if consulted  - Perform Range of Motion 3 times a day.   - Reposition patient every 2 hours.  - Dangle patient 2 times a day  - Stand patient 2 times a day  - Ambulate patient 2 times a day  - Out of bed to chair 2 times a day   - Out of bed for meals 3 times a day  - Out of bed for toileting  - Record patient progress and toleration of activity level   Outcome: Progressing     Problem: SAFETY,RESTRAINT: NV/NON-SELF DESTRUCTIVE BEHAVIOR  Goal: Remains free of harm/injury (restraint for non violent/non self-detsructive behavior)  Description: INTERVENTIONS:  - Instruct patient/family regarding restraint use   - Assess and monitor physiologic and psychological status   - Provide interventions and comfort measures to meet assessed patient needs   - Identify and implement measures to help patient regain control  - Assess readiness for release of restraint   Outcome: Progressing  Goal: Returns to optimal restraint-free functioning  Description: INTERVENTIONS:  - Assess the patient's behavior and symptoms that indicate continued need for restraint  - Identify and implement measures to help patient regain control  - Assess readiness for release of restraint   Outcome: Progressing     Problem: PAIN - ADULT  Goal: Verbalizes/displays adequate comfort level or baseline comfort level  Description: Interventions:  - Encourage patient to monitor pain and request assistance  - Assess pain using appropriate pain scale  - Administer analgesics based on type and severity of pain and evaluate response  - Implement non-pharmacological measures as appropriate and evaluate response  - Consider cultural and social influences on pain and pain management  - Notify physician/advanced practitioner if interventions unsuccessful or patient reports new pain  Outcome: Progressing     Problem: INFECTION - ADULT  Goal: Absence or prevention of progression during hospitalization  Description: INTERVENTIONS:  - Assess and monitor for signs and symptoms of infection  - Monitor lab/diagnostic results  - Monitor all insertion sites, i.e. indwelling lines, tubes, and drains  - Monitor endotracheal if appropriate and nasal secretions for changes in amount and color  - [place] appropriate cooling/warming therapies per order  - Administer medications as ordered  - Instruct and encourage patient and family to use good hand hygiene technique  - Identify and instruct in appropriate isolation precautions for identified infection/condition  Outcome: Progressing  Goal: Absence of fever/infection during neutropenic period  Description: INTERVENTIONS:  - Monitor WBC    Outcome: Progressing     Problem: SAFETY ADULT  Goal: Maintain or return to baseline ADL function  Description: INTERVENTIONS:  -  Assess patient's ability to carry out ADLs; assess patient's baseline for ADL function and identify physical deficits which impact ability to perform ADLs (bathing, care of mouth/teeth, toileting, grooming, dressing, etc.)  - Assess/evaluate cause of self-care deficits   - Assess range of motion  - Assess patient's mobility; develop plan if impaired  - Assess patient's need for assistive devices and provide as appropriate  - Encourage maximum independence but intervene and supervise when necessary  - Involve family in performance of ADLs  - Assess for home care needs following discharge   - Consider OT consult to assist with ADL evaluation and planning for discharge  - Provide patient education as appropriate  Outcome: Progressing  Goal: Maintains/Returns to pre admission functional level  Description: INTERVENTIONS:  - Perform BMAT or MOVE assessment daily.   - Set and communicate daily mobility goal to care team and patient/family/caregiver. - Collaborate with rehabilitation services on mobility goals if consulted  - Perform Range of Motion 3 times a day. - Reposition patient every 2 hours.   - Dangle patient 2 times a day  - Stand patient 2 times a day  - Ambulate patient 2 times a day  - Out of bed to chair 2 times a day   - Out of bed for meals 3 times a day  - Out of bed for toileting  - Record patient progress and toleration of activity level   Outcome: Progressing  Goal: Patient will remain free of falls  Description: INTERVENTIONS:  - Educate patient/family on patient safety including physical limitations  - Instruct patient to call for assistance with activity   - Consult OT/PT to assist with strengthening/mobility   - Keep Call bell within reach  - Keep bed low and locked with side rails adjusted as appropriate  - Keep care items and personal belongings within reach  - Initiate and maintain comfort rounds  - Make Fall Risk Sign visible to staff  - Offer Toileting every 2 Hours, in advance of need  - Initiate/Maintain bed alarm  - Obtain necessary fall risk management equipment: bed alarm  - Apply yellow socks and bracelet for high fall risk patients  - Consider moving patient to room near nurses station  Outcome: Progressing     Problem: DISCHARGE PLANNING  Goal: Discharge to home or other facility with appropriate resources  Description: INTERVENTIONS:  - Identify barriers to discharge w/patient and caregiver  - Arrange for needed discharge resources and transportation as appropriate  - Identify discharge learning needs (meds, wound care, etc.)  - Arrange for interpretive services to assist at discharge as needed  - Refer to Case Management Department for coordinating discharge planning if the patient needs post-hospital services based on physician/advanced practitioner order or complex needs related to functional status, cognitive ability, or social support system  Outcome: Progressing     Problem: Knowledge Deficit  Goal: Patient/family/caregiver demonstrates understanding of disease process, treatment plan, medications, and discharge instructions  Description: Complete learning assessment and assess knowledge base.   Interventions:  - Provide teaching at level of understanding  - Provide teaching via preferred learning methods  Outcome: Progressing
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
## 2023-09-07 NOTE — PROGRESS NOTES
Progress Note - Orthopedics   Ellyn Fontanez 35 y.o. male MRN: [id number]  Unit/Bed#: ICU 04      Subjective:    35 y.o.male with R shoulder dislocation and reduction. No acute events, no new complaints. Patient states he has numbness in his small and ring finger this morning and some trouble with range of motion as well as mild pain in his shoulder. Labs:  0   Lab Value Date/Time    HCT 23.2 (L) 09/07/2023 0426    HCT 30.0 (L) 09/06/2023 0458    HCT 33.7 (L) 09/06/2023 0041    HGB 7.9 (L) 09/07/2023 0426    HGB 8.5 (L) 09/06/2023 2155    HGB 8.8 (L) 09/06/2023 1656    INR 1.25 (H) 09/06/2023 0458    WBC 11.11 (H) 09/07/2023 0426    WBC 9.86 09/06/2023 0458    WBC 21.52 (H) 09/05/2023 1947       Meds:    Current Facility-Administered Medications:   •  acetaminophen (TYLENOL) tablet 975 mg, 975 mg, Oral, Q8H [facility], PETE Salas  •  bisacodyl (DULCOLAX) rectal suppository 10 mg, 10 mg, Rectal, Daily PRN, Bre Stafford PA-C  •  chlorhexidine (PERIDEX) 0.12 % oral rinse 15 mL, 15 mL, Mouth/Throat, Q12H [address], Bre Stafford PA-C, 15 mL at 09/06/23 2139  •  HYDROmorphone (DILAUDID) 1 mg/mL 50 mL PCA, , Intravenous, Continuous, PETE Michael, Rate Verify at 09/06/23 1900  •  methocarbamol (ROBAXIN) tablet 500 mg, 500 mg, Oral, Q6H [address], PETE Salas  •  multi-electrolyte (PLASMALYTE-A/ISOLYTE-S PH 7.4) IV solution, 250 mL/hr, Intravenous, Continuous, Bre Stafford PA-C, Last Rate: 250 mL/hr at 09/07/23 0403, 250 mL/hr at 09/07/23 0403  •  ondansetron (ZOFRAN) injection 4 mg, 4 mg, Intravenous, Q6H PRN, PETE Michael, 4 mg at 09/06/23 1932  •  senna-docusate sodium (SENOKOT S) 8.6-50 mg per tablet 2 tablet, 2 tablet, Oral, BID, Bre Stafford PA-C, 2 tablet at 09/06/23 1830    Blood Culture:   No results found for: "BLOODCX"    Wound Culture:   No results found for: "WOUNDCULT"    Ins and Outs:  I/O last 24 hours: In: 31219.1 [P.O.:480; I.V.:26419. 1; NG/GT:60; IV Piggyback:100]  Out: 66787 [Urine:9900; Emesis/NG output:110]          Physical:  Vitals:    09/07/23 0500   BP:    Pulse: 90   Resp: 17   Temp:    SpO2: 96%     Musculoskeletal: right Upper Extremity  · Skin warm, dry . No erythema or ecchymosis.   · TTP around right shoulder  · Sensation intact to median/radial/ulnar nerve distribution but decreased to ring and small finger  · Motor intact anterior interosseous nerve/posterior interosseous nerve/median/radial/ulnar nerve distributions but decreased to thumb flexion and finger adduction  · Difficulty and pain with ER and slight mechanical block  · 2+ radial pulse  · Digits warm and well perfused  · Capillary refill < 2 seconds      Assessment:    33 y.o.male with R shoulder dislocation s/p reduction    Plan:  · NWB RUE in sling  · Will discuss repeat radiographs on rounds today to ensure location of [facility] joint  · Will monitor for ABLA and administer IVF/prbc as indicated for Greater than 2 gram drop or Hgb < 7  · PT/OT  · Pain control  · DVT ppx per SICU  · Dispo: Ortho will follow    Shona Browne MD
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
## 2023-09-07 NOTE — DISCHARGE INSTR - AVS FIRST PAGE
Discharge Instructions - Orthopedics  Ellyn Jurado 35 y.o. male MRN: [id number]  Unit/Bed#: ICU 04    Weight Bearing Status:                                           Non weight bearing right upper extremity in sling    Pain:  Continue analgesics as directed    Dressing Instructions:   Please keep clean, dry and intact until follow up     Appt Instructions: If you do not have your appointment, please call the clinic at [phone]  Otherwise follow up as scheduled. Contact the office sooner if you experience any increased numbness/tingling in the extremities.

## 2023-09-07 NOTE — ASSESSMENT & PLAN NOTE
· Closely follow hemoglobin  · Resume DVT prophylaxis  · Consider iron panels versus empiric supplementation

## 2023-09-07 NOTE — PLAN OF CARE
Problem: PHYSICAL THERAPY ADULT  Goal: Performs mobility at highest level of function for planned discharge setting. See evaluation for individualized goals. Description: Treatment/Interventions: ADL retraining, Functional transfer training, LE strengthening/ROM, Elevations, Therapeutic exercise, Endurance training, Patient/family training, Equipment eval/education, Bed mobility, Gait training, Compensatory technique education, Spoke to nursing, Spoke to case management, Spoke to advanced practitioner, OT  Equipment Recommended:  (TBD w/ progress)       See flowsheet documentation for full assessment, interventions and recommendations. Note: Prognosis: Fair  Problem List: Decreased strength, Decreased range of motion, Decreased endurance, Impaired balance, Decreased mobility, Decreased skin integrity, Orthopedic restrictions, Pain     Barriers to Discharge: Inaccessible home environment     PT Discharge Recommendation: Home with outpatient rehabilitation (pending progress and improved functional performance prior to d/c.)    See flowsheet documentation for full assessment.
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
## 2023-09-07 NOTE — ASSESSMENT & PLAN NOTE
· POD #2 from exploration  · PT/OT  · Appreciate acute care surgery and vascular surgery recommendations

## 2023-09-07 NOTE — ASSESSMENT & PLAN NOTE
· CK peaked in mid-5000s, now downtrending  · Follow daily to ensure continued downtrend  · IVF with Lasix to stimulate urine output

## 2023-09-08 ENCOUNTER — TELEPHONE (OUTPATIENT)
Dept: UROLOGY | Facility: AMBULATORY SURGERY CENTER | Age: 33
End: 2023-09-08

## 2023-09-08 ENCOUNTER — ANESTHESIA EVENT (INPATIENT)
Dept: PERIOP | Facility: HOSPITAL | Age: 33
DRG: 952 | End: 2023-09-08
Payer: COMMERCIAL

## 2023-09-08 ENCOUNTER — ANESTHESIA (INPATIENT)
Dept: PERIOP | Facility: HOSPITAL | Age: 33
DRG: 952 | End: 2023-09-08
Payer: COMMERCIAL

## 2023-09-08 LAB
ANION GAP SERPL CALCULATED.3IONS-SCNC: 5 MMOL/L
BUN SERPL-MCNC: 5 MG/DL (ref 5–25)
CALCIUM SERPL-MCNC: 8.3 MG/DL (ref 8.4–10.2)
CHLORIDE SERPL-SCNC: 109 MMOL/L (ref 96–108)
CO2 SERPL-SCNC: 28 MMOL/L (ref 21–32)
CREAT SERPL-MCNC: 0.74 MG/DL (ref 0.6–1.3)
ERYTHROCYTE [DISTWIDTH] IN BLOOD BY AUTOMATED COUNT: 13.7 % (ref 11.6–15.1)
GFR SERPL CREATININE-BSD FRML MDRD: 121 ML/MIN/1.73SQ M
GLUCOSE SERPL-MCNC: 105 MG/DL (ref 65–140)
HCT VFR BLD AUTO: 22.8 % (ref 36.5–49.3)
HGB BLD-MCNC: 7.6 G/DL (ref 12–17)
MAGNESIUM SERPL-MCNC: 1.8 MG/DL (ref 1.9–2.7)
MCH RBC QN AUTO: 30.2 PG (ref 26.8–34.3)
MCHC RBC AUTO-ENTMCNC: 33.3 G/DL (ref 31.4–37.4)
MCV RBC AUTO: 91 FL (ref 82–98)
PLATELET # BLD AUTO: 146 THOUSANDS/UL (ref 149–390)
PMV BLD AUTO: 10.4 FL (ref 8.9–12.7)
POTASSIUM SERPL-SCNC: 4.4 MMOL/L (ref 3.5–5.3)
RBC # BLD AUTO: 2.52 MILLION/UL (ref 3.88–5.62)
SODIUM SERPL-SCNC: 142 MMOL/L (ref 135–147)
WBC # BLD AUTO: 11.78 THOUSAND/UL (ref 4.31–10.16)

## 2023-09-08 PROCEDURE — 13160 SEC CLSR SURG WND/DEHSN XTN: CPT | Performed by: STUDENT IN AN ORGANIZED HEALTH CARE EDUCATION/TRAINING PROGRAM

## 2023-09-08 PROCEDURE — 99233 SBSQ HOSP IP/OBS HIGH 50: CPT | Performed by: EMERGENCY MEDICINE

## 2023-09-08 PROCEDURE — 85027 COMPLETE CBC AUTOMATED: CPT | Performed by: NURSE PRACTITIONER

## 2023-09-08 PROCEDURE — 80048 BASIC METABOLIC PNL TOTAL CA: CPT | Performed by: NURSE PRACTITIONER

## 2023-09-08 PROCEDURE — 99024 POSTOP FOLLOW-UP VISIT: CPT | Performed by: STUDENT IN AN ORGANIZED HEALTH CARE EDUCATION/TRAINING PROGRAM

## 2023-09-08 PROCEDURE — 94760 N-INVAS EAR/PLS OXIMETRY 1: CPT

## 2023-09-08 PROCEDURE — 83735 ASSAY OF MAGNESIUM: CPT | Performed by: NURSE PRACTITIONER

## 2023-09-08 RX ORDER — HYDROMORPHONE HCL/PF 1 MG/ML
SYRINGE (ML) INJECTION AS NEEDED
Status: DISCONTINUED | OUTPATIENT
Start: 2023-09-08 | End: 2023-09-08

## 2023-09-08 RX ORDER — ALBUTEROL SULFATE 2.5 MG/3ML
2.5 SOLUTION RESPIRATORY (INHALATION) ONCE AS NEEDED
Status: DISCONTINUED | OUTPATIENT
Start: 2023-09-08 | End: 2023-09-08 | Stop reason: HOSPADM

## 2023-09-08 RX ORDER — METOCLOPRAMIDE HYDROCHLORIDE 5 MG/ML
10 INJECTION INTRAMUSCULAR; INTRAVENOUS ONCE AS NEEDED
Status: DISCONTINUED | OUTPATIENT
Start: 2023-09-08 | End: 2023-09-08 | Stop reason: HOSPADM

## 2023-09-08 RX ORDER — OXYCODONE HYDROCHLORIDE 5 MG/1
5 TABLET ORAL EVERY 4 HOURS PRN
Status: DISCONTINUED | OUTPATIENT
Start: 2023-09-08 | End: 2023-09-15 | Stop reason: HOSPADM

## 2023-09-08 RX ORDER — CEFAZOLIN SODIUM 2 G/50ML
2000 SOLUTION INTRAVENOUS
Status: DISCONTINUED | OUTPATIENT
Start: 2023-09-08 | End: 2023-09-08 | Stop reason: HOSPADM

## 2023-09-08 RX ORDER — CEFAZOLIN SODIUM 1 G/3ML
INJECTION, POWDER, FOR SOLUTION INTRAMUSCULAR; INTRAVENOUS AS NEEDED
Status: DISCONTINUED | OUTPATIENT
Start: 2023-09-08 | End: 2023-09-08

## 2023-09-08 RX ORDER — MAGNESIUM HYDROXIDE 1200 MG/15ML
LIQUID ORAL AS NEEDED
Status: DISCONTINUED | OUTPATIENT
Start: 2023-09-08 | End: 2023-09-08 | Stop reason: HOSPADM

## 2023-09-08 RX ORDER — MIDAZOLAM HYDROCHLORIDE 2 MG/2ML
INJECTION, SOLUTION INTRAMUSCULAR; INTRAVENOUS AS NEEDED
Status: DISCONTINUED | OUTPATIENT
Start: 2023-09-08 | End: 2023-09-08

## 2023-09-08 RX ORDER — HYDROMORPHONE HCL IN WATER/PF 6 MG/30 ML
0.2 PATIENT CONTROLLED ANALGESIA SYRINGE INTRAVENOUS
Status: ACTIVE | OUTPATIENT
Start: 2023-09-08 | End: 2023-09-08

## 2023-09-08 RX ORDER — FENTANYL CITRATE 50 UG/ML
INJECTION, SOLUTION INTRAMUSCULAR; INTRAVENOUS AS NEEDED
Status: DISCONTINUED | OUTPATIENT
Start: 2023-09-08 | End: 2023-09-08

## 2023-09-08 RX ORDER — ONDANSETRON 2 MG/ML
4 INJECTION INTRAMUSCULAR; INTRAVENOUS ONCE AS NEEDED
Status: DISCONTINUED | OUTPATIENT
Start: 2023-09-08 | End: 2023-09-08 | Stop reason: HOSPADM

## 2023-09-08 RX ORDER — MAGNESIUM SULFATE HEPTAHYDRATE 40 MG/ML
2 INJECTION, SOLUTION INTRAVENOUS ONCE
Status: COMPLETED | OUTPATIENT
Start: 2023-09-08 | End: 2023-09-09

## 2023-09-08 RX ORDER — PROPOFOL 10 MG/ML
INJECTION, EMULSION INTRAVENOUS AS NEEDED
Status: DISCONTINUED | OUTPATIENT
Start: 2023-09-08 | End: 2023-09-08

## 2023-09-08 RX ORDER — LIDOCAINE HYDROCHLORIDE 10 MG/ML
INJECTION, SOLUTION EPIDURAL; INFILTRATION; INTRACAUDAL; PERINEURAL AS NEEDED
Status: DISCONTINUED | OUTPATIENT
Start: 2023-09-08 | End: 2023-09-08

## 2023-09-08 RX ORDER — HYDROMORPHONE HCL/PF 1 MG/ML
0.5 SYRINGE (ML) INJECTION EVERY 4 HOURS PRN
Status: DISCONTINUED | OUTPATIENT
Start: 2023-09-08 | End: 2023-09-15

## 2023-09-08 RX ORDER — FENTANYL CITRATE/PF 50 MCG/ML
25 SYRINGE (ML) INJECTION
Status: COMPLETED | OUTPATIENT
Start: 2023-09-08 | End: 2023-09-08

## 2023-09-08 RX ORDER — SODIUM CHLORIDE, SODIUM LACTATE, POTASSIUM CHLORIDE, CALCIUM CHLORIDE 600; 310; 30; 20 MG/100ML; MG/100ML; MG/100ML; MG/100ML
INJECTION, SOLUTION INTRAVENOUS CONTINUOUS PRN
Status: DISCONTINUED | OUTPATIENT
Start: 2023-09-08 | End: 2023-09-08

## 2023-09-08 RX ORDER — OXYCODONE HYDROCHLORIDE 10 MG/1
10 TABLET ORAL EVERY 4 HOURS PRN
Status: DISCONTINUED | OUTPATIENT
Start: 2023-09-08 | End: 2023-09-15 | Stop reason: HOSPADM

## 2023-09-08 RX ADMIN — METHOCARBAMOL 500 MG: 500 TABLET ORAL at 17:17

## 2023-09-08 RX ADMIN — FENTANYL CITRATE 25 MCG: 50 INJECTION, SOLUTION INTRAMUSCULAR; INTRAVENOUS at 13:03

## 2023-09-08 RX ADMIN — BACITRACIN ZINC 1 SMALL APPLICATION: 500 OINTMENT TOPICAL at 08:27

## 2023-09-08 RX ADMIN — OXYCODONE HYDROCHLORIDE 10 MG: 10 TABLET ORAL at 21:51

## 2023-09-08 RX ADMIN — FENTANYL CITRATE 25 MCG: 50 INJECTION, SOLUTION INTRAMUSCULAR; INTRAVENOUS at 12:48

## 2023-09-08 RX ADMIN — PROPOFOL 200 MG: 10 INJECTION, EMULSION INTRAVENOUS at 12:48

## 2023-09-08 RX ADMIN — MAGNESIUM SULFATE HEPTAHYDRATE 2 G: 40 INJECTION, SOLUTION INTRAVENOUS at 08:28

## 2023-09-08 RX ADMIN — METHOCARBAMOL 500 MG: 500 TABLET ORAL at 05:34

## 2023-09-08 RX ADMIN — ONDANSETRON 4 MG: 2 INJECTION INTRAMUSCULAR; INTRAVENOUS at 13:44

## 2023-09-08 RX ADMIN — CEFAZOLIN 2000 MG: 1 INJECTION, POWDER, FOR SOLUTION INTRAMUSCULAR; INTRAVENOUS at 12:50

## 2023-09-08 RX ADMIN — FENTANYL CITRATE 25 MCG: 50 INJECTION INTRAMUSCULAR; INTRAVENOUS at 14:30

## 2023-09-08 RX ADMIN — METHOCARBAMOL 500 MG: 500 TABLET ORAL at 23:40

## 2023-09-08 RX ADMIN — HYDROMORPHONE HYDROCHLORIDE 0.5 MG: 1 INJECTION, SOLUTION INTRAMUSCULAR; INTRAVENOUS; SUBCUTANEOUS at 14:14

## 2023-09-08 RX ADMIN — Medication: at 16:03

## 2023-09-08 RX ADMIN — FENTANYL CITRATE 25 MCG: 50 INJECTION, SOLUTION INTRAMUSCULAR; INTRAVENOUS at 13:07

## 2023-09-08 RX ADMIN — HYDROMORPHONE HYDROCHLORIDE 0.5 MG: 1 INJECTION, SOLUTION INTRAMUSCULAR; INTRAVENOUS; SUBCUTANEOUS at 19:31

## 2023-09-08 RX ADMIN — FENTANYL CITRATE 25 MCG: 50 INJECTION INTRAMUSCULAR; INTRAVENOUS at 14:35

## 2023-09-08 RX ADMIN — ACETAMINOPHEN 975 MG: 325 TABLET, FILM COATED ORAL at 21:51

## 2023-09-08 RX ADMIN — SODIUM CHLORIDE, SODIUM LACTATE, POTASSIUM CHLORIDE, AND CALCIUM CHLORIDE: .6; .31; .03; .02 INJECTION, SOLUTION INTRAVENOUS at 12:37

## 2023-09-08 RX ADMIN — FENTANYL CITRATE 25 MCG: 50 INJECTION INTRAMUSCULAR; INTRAVENOUS at 14:40

## 2023-09-08 RX ADMIN — MIDAZOLAM 2 MG: 1 INJECTION INTRAMUSCULAR; INTRAVENOUS at 12:37

## 2023-09-08 RX ADMIN — ENOXAPARIN SODIUM 30 MG: 30 INJECTION SUBCUTANEOUS at 21:51

## 2023-09-08 RX ADMIN — BACITRACIN ZINC 1 SMALL APPLICATION: 500 OINTMENT TOPICAL at 17:16

## 2023-09-08 RX ADMIN — ACETAMINOPHEN 975 MG: 325 TABLET, FILM COATED ORAL at 16:04

## 2023-09-08 RX ADMIN — ENOXAPARIN SODIUM 30 MG: 30 INJECTION SUBCUTANEOUS at 08:27

## 2023-09-08 RX ADMIN — LIDOCAINE HYDROCHLORIDE 50 MG: 10 INJECTION, SOLUTION EPIDURAL; INFILTRATION; INTRACAUDAL; PERINEURAL at 12:47

## 2023-09-08 RX ADMIN — ACETAMINOPHEN 975 MG: 325 TABLET, FILM COATED ORAL at 05:34

## 2023-09-08 RX ADMIN — FENTANYL CITRATE 25 MCG: 50 INJECTION, SOLUTION INTRAMUSCULAR; INTRAVENOUS at 12:57

## 2023-09-08 RX ADMIN — FENTANYL CITRATE 25 MCG: 50 INJECTION INTRAMUSCULAR; INTRAVENOUS at 14:45

## 2023-09-08 RX ADMIN — OXYCODONE HYDROCHLORIDE 10 MG: 10 TABLET ORAL at 17:16

## 2023-09-08 RX ADMIN — HYDROMORPHONE HYDROCHLORIDE 0.5 MG: 1 INJECTION, SOLUTION INTRAMUSCULAR; INTRAVENOUS; SUBCUTANEOUS at 13:44
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
## 2023-09-08 NOTE — ASSESSMENT & PLAN NOTE
- Right Shoulder dislocation, present on admission.  - Status post reduction  - Appreciate Orthopedic surgery evaluation, recommendations and interventions as noted. - Maintain non weightbearing status on the right upper extremity in sling.  - Monitor right upper extremity neurovascular exam.  - Continue multimodal analgesic regimen.  - Continue DVT prophylaxis. - PT and OT evaluation and treatment as indicated. - Outpatient follow up with Orthopedic surgery for re-evaluation.
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
## 2023-09-08 NOTE — ANESTHESIA POSTPROCEDURE EVALUATION
Post-Op Assessment Note    CV Status:  Stable  Pain Score: 3    Pain management: adequate     Mental Status:  Alert and awake   Hydration Status:  Euvolemic   PONV Controlled:  Controlled   Airway Patency:  Patent      Post Op Vitals Reviewed: Yes      Staff: CRNA         No notable events documented.     BP   153/93   Temp   98.0   Pulse  77   Resp 17   SpO2  100% on 2lpm nc

## 2023-09-08 NOTE — ANESTHESIA PREPROCEDURE EVALUATION
Procedure:  DEBRIDEMENT LOWER EXTREMITY [facility] OUT) (Left: Knee)    Relevant Problems   CARDIO   (+) Cardiac arrest (HCC)      HEMATOLOGY   (+) Acute blood loss anemia      MUSCULOSKELETAL   (+) Rhabdomyolysis        Physical Exam    Airway    Mallampati score: I  TM Distance: >3 FB  Neck ROM: full     Dental   No notable dental hx     Cardiovascular  Cardiovascular exam normal    Pulmonary  Pulmonary exam normal     Other Findings        Anesthesia Plan  ASA Score- 2     Anesthesia Type- general with ASA Monitors. Additional Monitors:   Airway Plan: ETT and LMA. Comment: Cardiac arrest during initial surgery- severe acidosis/blood loss. Now stable. Denies any cardiac hx. On Dilaudid PCA. Plan Factors-    Chart reviewed. EKG reviewed. Imaging results reviewed. Existing labs reviewed. Patient summary reviewed. Patient is not a current smoker. Induction- intravenous. Postoperative Plan- Plan for postoperative opioid use. Planned trial extubation    Informed Consent- Anesthetic plan and risks discussed with patient. I personally reviewed this patient with the CRNA. Discussed and agreed on the Anesthesia Plan with the CRNA. Lidia Phalen

## 2023-09-08 NOTE — PLAN OF CARE
Problem: Prexisting or High Potential for Compromised Skin Integrity  Goal: Skin integrity is maintained or improved  Description: INTERVENTIONS:  - Identify patients at risk for skin breakdown  - Assess and monitor skin integrity  - Assess and monitor nutrition and hydration status  - Monitor labs   - Assess for incontinence   - Turn and reposition patient  - Assist with mobility/ambulation  - Relieve pressure over bony prominences  - Avoid friction and shearing  - Provide appropriate hygiene as needed including keeping skin clean and dry  - Evaluate need for skin moisturizer/barrier cream  - Collaborate with interdisciplinary team   - Patient/family teaching  - Consider wound care consult   Outcome: Progressing     Problem: MOBILITY - ADULT  Goal: Maintain or return to baseline ADL function  Description: INTERVENTIONS:  -  Assess patient's ability to carry out ADLs; assess patient's baseline for ADL function and identify physical deficits which impact ability to perform ADLs (bathing, care of mouth/teeth, toileting, grooming, dressing, etc.)  - Assess/evaluate cause of self-care deficits   - Assess range of motion  - Assess patient's mobility; develop plan if impaired  - Assess patient's need for assistive devices and provide as appropriate  - Encourage maximum independence but intervene and supervise when necessary  - Involve family in performance of ADLs  - Assess for home care needs following discharge   - Consider OT consult to assist with ADL evaluation and planning for discharge  - Provide patient education as appropriate  Outcome: Progressing  Goal: Maintains/Returns to pre admission functional level  Description: INTERVENTIONS:  - Perform BMAT or MOVE assessment daily.   - Set and communicate daily mobility goal to care team and patient/family/caregiver. - Collaborate with rehabilitation services on mobility goals if consulted  - Perform Range of Motion 3 times a day.   - Reposition patient every 2 hours.  - Dangle patient 3 times a day  - Stand patient 3 times a day  - Ambulate patient 3 times a day  - Out of bed to chair 3 times a day   - Out of bed for meals 3 times a day  - Out of bed for toileting  - Record patient progress and toleration of activity level   Outcome: Progressing     Problem: SAFETY,RESTRAINT: NV/NON-SELF DESTRUCTIVE BEHAVIOR  Goal: Remains free of harm/injury (restraint for non violent/non self-detsructive behavior)  Description: INTERVENTIONS:  - Instruct patient/family regarding restraint use   - Assess and monitor physiologic and psychological status   - Provide interventions and comfort measures to meet assessed patient needs   - Identify and implement measures to help patient regain control  - Assess readiness for release of restraint   Outcome: Progressing  Goal: Returns to optimal restraint-free functioning  Description: INTERVENTIONS:  - Assess the patient's behavior and symptoms that indicate continued need for restraint  - Identify and implement measures to help patient regain control  - Assess readiness for release of restraint   Outcome: Progressing     Problem: PAIN - ADULT  Goal: Verbalizes/displays adequate comfort level or baseline comfort level  Description: Interventions:  - Encourage patient to monitor pain and request assistance  - Assess pain using appropriate pain scale  - Administer analgesics based on type and severity of pain and evaluate response  - Implement non-pharmacological measures as appropriate and evaluate response  - Consider cultural and social influences on pain and pain management  - Notify physician/advanced practitioner if interventions unsuccessful or patient reports new pain  Outcome: Progressing     Problem: INFECTION - ADULT  Goal: Absence or prevention of progression during hospitalization  Description: INTERVENTIONS:  - Assess and monitor for signs and symptoms of infection  - Monitor lab/diagnostic results  - Monitor all insertion sites, i.e. indwelling lines, tubes, and drains  - Monitor endotracheal if appropriate and nasal secretions for changes in amount and color  - [place] appropriate cooling/warming therapies per order  - Administer medications as ordered  - Instruct and encourage patient and family to use good hand hygiene technique  - Identify and instruct in appropriate isolation precautions for identified infection/condition  Outcome: Progressing  Goal: Absence of fever/infection during neutropenic period  Description: INTERVENTIONS:  - Monitor WBC    Outcome: Progressing     Problem: SAFETY ADULT  Goal: Maintain or return to baseline ADL function  Description: INTERVENTIONS:  -  Assess patient's ability to carry out ADLs; assess patient's baseline for ADL function and identify physical deficits which impact ability to perform ADLs (bathing, care of mouth/teeth, toileting, grooming, dressing, etc.)  - Assess/evaluate cause of self-care deficits   - Assess range of motion  - Assess patient's mobility; develop plan if impaired  - Assess patient's need for assistive devices and provide as appropriate  - Encourage maximum independence but intervene and supervise when necessary  - Involve family in performance of ADLs  - Assess for home care needs following discharge   - Consider OT consult to assist with ADL evaluation and planning for discharge  - Provide patient education as appropriate  Outcome: Progressing  Goal: Maintains/Returns to pre admission functional level  Description: INTERVENTIONS:  - Perform BMAT or MOVE assessment daily.   - Set and communicate daily mobility goal to care team and patient/family/caregiver. - Collaborate with rehabilitation services on mobility goals if consulted  - Perform Range of Motion 3 times a day. - Reposition patient every 2 hours.   - Dangle patient 3 times a day  - Stand patient 3 times a day  - Ambulate patient 3 times a day  - Out of bed to chair 3 times a day   - Out of bed for meals 3 times a day  - Out of bed for toileting  - Record patient progress and toleration of activity level   Outcome: Progressing  Goal: Patient will remain free of falls  Description: INTERVENTIONS:  - Educate patient/family on patient safety including physical limitations  - Instruct patient to call for assistance with activity   - Consult OT/PT to assist with strengthening/mobility   - Keep Call bell within reach  - Keep bed low and locked with side rails adjusted as appropriate  - Keep care items and personal belongings within reach  - Initiate and maintain comfort rounds  - Make Fall Risk Sign visible to staff  - Offer Toileting every 2 Hours, in advance of need  - Initiate/Maintain alarm  - Obtain necessary fall risk management equipment:   - Apply yellow socks and bracelet for high fall risk patients  - Consider moving patient to room near nurses station  Outcome: Progressing     Problem: DISCHARGE PLANNING  Goal: Discharge to home or other facility with appropriate resources  Description: INTERVENTIONS:  - Identify barriers to discharge w/patient and caregiver  - Arrange for needed discharge resources and transportation as appropriate  - Identify discharge learning needs (meds, wound care, etc.)  - Arrange for interpretive services to assist at discharge as needed  - Refer to Case Management Department for coordinating discharge planning if the patient needs post-hospital services based on physician/advanced practitioner order or complex needs related to functional status, cognitive ability, or social support system  Outcome: Progressing     Problem: Knowledge Deficit  Goal: Patient/family/caregiver demonstrates understanding of disease process, treatment plan, medications, and discharge instructions  Description: Complete learning assessment and assess knowledge base.   Interventions:  - Provide teaching at level of understanding  - Provide teaching via preferred learning methods  Outcome: Progressing     Problem: Nutrition/Hydration-ADULT  Goal: Nutrient/Hydration intake appropriate for improving, restoring or maintaining nutritional needs  Description: Monitor and assess patient's nutrition/hydration status for malnutrition. Collaborate with interdisciplinary team and initiate plan and interventions as ordered. Monitor patient's weight and dietary intake as ordered or per policy. Utilize nutrition screening tool and intervene as necessary. Determine patient's food preferences and provide high-protein, high-caloric foods as appropriate.      INTERVENTIONS:  - Monitor oral intake, urinary output, labs, and treatment plans  - Assess nutrition and hydration status and recommend course of action  - Evaluate amount of meals eaten  - Assist patient with eating if necessary   - Allow adequate time for meals  - Recommend/ encourage appropriate diets, oral nutritional supplements, and vitamin/mineral supplements  - Order, calculate, and assess calorie counts as needed  - Recommend, monitor, and adjust tube feedings and TPN/PPN based on assessed needs  - Assess need for intravenous fluids  - Provide specific nutrition/hydration education as appropriate  - Include patient/family/caregiver in decisions related to nutrition  Outcome: Progressing

## 2023-09-08 NOTE — ASSESSMENT & PLAN NOTE
· Occurred during induction and release of tourniquet  · ECHO within normal limits  · Monitor vitals
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
## 2023-09-08 NOTE — PHYSICAL THERAPY NOTE
Physical Therapy Cancellation Note     09/08/23 1200   Note Type   Note Type Cancelled Session   Cancel Reasons Patient to operating room         Demetra Bhatti PTA

## 2023-09-08 NOTE — QUICK NOTE
Updated brother Maico Stagers regarding findings in surgery. All questions answered.      Juany Sadler, DO

## 2023-09-08 NOTE — PLAN OF CARE
Problem: Prexisting or High Potential for Compromised Skin Integrity  Goal: Skin integrity is maintained or improved  Description: INTERVENTIONS:  - Identify patients at risk for skin breakdown  - Assess and monitor skin integrity  - Assess and monitor nutrition and hydration status  - Monitor labs   - Assess for incontinence   - Turn and reposition patient  - Assist with mobility/ambulation  - Relieve pressure over bony prominences  - Avoid friction and shearing  - Provide appropriate hygiene as needed including keeping skin clean and dry  - Evaluate need for skin moisturizer/barrier cream  - Collaborate with interdisciplinary team   - Patient/family teaching  - Consider wound care consult   Outcome: Progressing     Problem: MOBILITY - ADULT  Goal: Maintain or return to baseline ADL function  Description: INTERVENTIONS:  -  Assess patient's ability to carry out ADLs; assess patient's baseline for ADL function and identify physical deficits which impact ability to perform ADLs (bathing, care of mouth/teeth, toileting, grooming, dressing, etc.)  - Assess/evaluate cause of self-care deficits   - Assess range of motion  - Assess patient's mobility; develop plan if impaired  - Assess patient's need for assistive devices and provide as appropriate  - Encourage maximum independence but intervene and supervise when necessary  - Involve family in performance of ADLs  - Assess for home care needs following discharge   - Consider OT consult to assist with ADL evaluation and planning for discharge  - Provide patient education as appropriate  Outcome: Progressing  Goal: Maintains/Returns to pre admission functional level  Description: INTERVENTIONS:  - Perform BMAT or MOVE assessment daily.   - Set and communicate daily mobility goal to care team and patient/family/caregiver. - Collaborate with rehabilitation services on mobility goals if consulted  - Perform Range of Motion 3 times a day.   - Reposition patient every 2 hours.  - Dangle patient 3 times a day  - Stand patient 3 times a day  - Ambulate patient 3 times a day  - Out of bed to chair 3 times a day   - Out of bed for meals 3 times a day  - Out of bed for toileting  - Record patient progress and toleration of activity level   Outcome: Progressing     Problem: SAFETY,RESTRAINT: NV/NON-SELF DESTRUCTIVE BEHAVIOR  Goal: Remains free of harm/injury (restraint for non violent/non self-detsructive behavior)  Description: INTERVENTIONS:  - Instruct patient/family regarding restraint use   - Assess and monitor physiologic and psychological status   - Provide interventions and comfort measures to meet assessed patient needs   - Identify and implement measures to help patient regain control  - Assess readiness for release of restraint   Outcome: Progressing  Goal: Returns to optimal restraint-free functioning  Description: INTERVENTIONS:  - Assess the patient's behavior and symptoms that indicate continued need for restraint  - Identify and implement measures to help patient regain control  - Assess readiness for release of restraint   Outcome: Progressing     Problem: PAIN - ADULT  Goal: Verbalizes/displays adequate comfort level or baseline comfort level  Description: Interventions:  - Encourage patient to monitor pain and request assistance  - Assess pain using appropriate pain scale  - Administer analgesics based on type and severity of pain and evaluate response  - Implement non-pharmacological measures as appropriate and evaluate response  - Consider cultural and social influences on pain and pain management  - Notify physician/advanced practitioner if interventions unsuccessful or patient reports new pain  Outcome: Progressing     Problem: INFECTION - ADULT  Goal: Absence or prevention of progression during hospitalization  Description: INTERVENTIONS:  - Assess and monitor for signs and symptoms of infection  - Monitor lab/diagnostic results  - Monitor all insertion sites, i.e. indwelling lines, tubes, and drains  - Monitor endotracheal if appropriate and nasal secretions for changes in amount and color  - Brunson appropriate cooling/warming therapies per order  - Administer medications as ordered  - Instruct and encourage patient and family to use good hand hygiene technique  - Identify and instruct in appropriate isolation precautions for identified infection/condition  Outcome: Progressing  Goal: Absence of fever/infection during neutropenic period  Description: INTERVENTIONS:  - Monitor WBC    Outcome: Progressing     Problem: SAFETY ADULT  Goal: Maintain or return to baseline ADL function  Description: INTERVENTIONS:  -  Assess patient's ability to carry out ADLs; assess patient's baseline for ADL function and identify physical deficits which impact ability to perform ADLs (bathing, care of mouth/teeth, toileting, grooming, dressing, etc.)  - Assess/evaluate cause of self-care deficits   - Assess range of motion  - Assess patient's mobility; develop plan if impaired  - Assess patient's need for assistive devices and provide as appropriate  - Encourage maximum independence but intervene and supervise when necessary  - Involve family in performance of ADLs  - Assess for home care needs following discharge   - Consider OT consult to assist with ADL evaluation and planning for discharge  - Provide patient education as appropriate  Outcome: Progressing  Goal: Maintains/Returns to pre admission functional level  Description: INTERVENTIONS:  - Perform BMAT or MOVE assessment daily.   - Set and communicate daily mobility goal to care team and patient/family/caregiver. - Collaborate with rehabilitation services on mobility goals if consulted  - Perform Range of Motion 3 times a day. - Reposition patient every 2 hours.   - Dangle patient 3 times a day  - Stand patient 3 times a day  - Ambulate patient 3 times a day  - Out of bed to chair 3 times a day   - Out of bed for meals 3 times a day  - Out of bed for toileting  - Record patient progress and toleration of activity level   Outcome: Progressing  Goal: Patient will remain free of falls  Description: INTERVENTIONS:  - Educate patient/family on patient safety including physical limitations  - Instruct patient to call for assistance with activity   - Consult OT/PT to assist with strengthening/mobility   - Keep Call bell within reach  - Keep bed low and locked with side rails adjusted as appropriate  - Keep care items and personal belongings within reach  - Initiate and maintain comfort rounds  - Make Fall Risk Sign visible to staff  - Apply yellow socks and bracelet for high fall risk patients  - Consider moving patient to room near nurses station  Outcome: Progressing     Problem: DISCHARGE PLANNING  Goal: Discharge to home or other facility with appropriate resources  Description: INTERVENTIONS:  - Identify barriers to discharge w/patient and caregiver  - Arrange for needed discharge resources and transportation as appropriate  - Identify discharge learning needs (meds, wound care, etc.)  - Arrange for interpretive services to assist at discharge as needed  - Refer to Case Management Department for coordinating discharge planning if the patient needs post-hospital services based on physician/advanced practitioner order or complex needs related to functional status, cognitive ability, or social support system  Outcome: Progressing     Problem: Knowledge Deficit  Goal: Patient/family/caregiver demonstrates understanding of disease process, treatment plan, medications, and discharge instructions  Description: Complete learning assessment and assess knowledge base.   Interventions:  - Provide teaching at level of understanding  - Provide teaching via preferred learning methods  Outcome: Progressing     Problem: Nutrition/Hydration-ADULT  Goal: Nutrient/Hydration intake appropriate for improving, restoring or maintaining nutritional needs  Description: Monitor and assess patient's nutrition/hydration status for malnutrition. Collaborate with interdisciplinary team and initiate plan and interventions as ordered. Monitor patient's weight and dietary intake as ordered or per policy. Utilize nutrition screening tool and intervene as necessary. Determine patient's food preferences and provide high-protein, high-caloric foods as appropriate.      INTERVENTIONS:  - Monitor oral intake, urinary output, labs, and treatment plans  - Assess nutrition and hydration status and recommend course of action  - Evaluate amount of meals eaten  - Assist patient with eating if necessary   - Allow adequate time for meals  - Recommend/ encourage appropriate diets, oral nutritional supplements, and vitamin/mineral supplements  - Order, calculate, and assess calorie counts as needed  - Recommend, monitor, and adjust tube feedings and TPN/PPN based on assessed needs  - Assess need for intravenous fluids  - Provide specific nutrition/hydration education as appropriate  - Include patient/family/caregiver in decisions related to nutrition  Outcome: Progressing     Problem: HEMATOLOGIC - ADULT  Goal: Maintains hematologic stability  Description: INTERVENTIONS  - Assess for signs and symptoms of bleeding or hemorrhage  - Monitor labs  - Administer supportive blood products/factors as ordered and appropriate  Outcome: Progressing     Problem: MUSCULOSKELETAL - ADULT  Goal: Maintain or return mobility to safest level of function  Description: INTERVENTIONS:  - Assess patient's ability to carry out ADLs; assess patient's baseline for ADL function and identify physical deficits which impact ability to perform ADLs (bathing, care of mouth/teeth, toileting, grooming, dressing, etc.)  - Assess/evaluate cause of self-care deficits   - Assess range of motion  - Assess patient's mobility  - Assess patient's need for assistive devices and provide as appropriate  - Encourage maximum independence but intervene and supervise when necessary  - Involve family in performance of ADLs  - Assess for home care needs following discharge   - Consider OT consult to assist with ADL evaluation and planning for discharge  - Provide patient education as appropriate  Outcome: Progressing  Goal: Maintain proper alignment of affected body part  Description: INTERVENTIONS:  - Support, maintain and protect limb and body alignment  - Provide patient/ family with appropriate education  Outcome: Progressing
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
## 2023-09-09 PROBLEM — M62.82 RHABDOMYOLYSIS: Status: RESOLVED | Noted: 2023-09-07 | Resolved: 2023-09-09

## 2023-09-09 LAB
ANION GAP SERPL CALCULATED.3IONS-SCNC: 2 MMOL/L
BUN SERPL-MCNC: 7 MG/DL (ref 5–25)
CALCIUM SERPL-MCNC: 8 MG/DL (ref 8.4–10.2)
CHLORIDE SERPL-SCNC: 106 MMOL/L (ref 96–108)
CO2 SERPL-SCNC: 32 MMOL/L (ref 21–32)
CREAT SERPL-MCNC: 0.79 MG/DL (ref 0.6–1.3)
ERYTHROCYTE [DISTWIDTH] IN BLOOD BY AUTOMATED COUNT: 13.5 % (ref 11.6–15.1)
GFR SERPL CREATININE-BSD FRML MDRD: 117 ML/MIN/1.73SQ M
GLUCOSE SERPL-MCNC: 102 MG/DL (ref 65–140)
HCT VFR BLD AUTO: 22.1 % (ref 36.5–49.3)
HGB BLD-MCNC: 7.1 G/DL (ref 12–17)
MAGNESIUM SERPL-MCNC: 1.9 MG/DL (ref 1.9–2.7)
MCH RBC QN AUTO: 29.6 PG (ref 26.8–34.3)
MCHC RBC AUTO-ENTMCNC: 32.1 G/DL (ref 31.4–37.4)
MCV RBC AUTO: 92 FL (ref 82–98)
PHOSPHATE SERPL-MCNC: 3.4 MG/DL (ref 2.7–4.5)
PLATELET # BLD AUTO: 187 THOUSANDS/UL (ref 149–390)
PMV BLD AUTO: 10.2 FL (ref 8.9–12.7)
POTASSIUM SERPL-SCNC: 4 MMOL/L (ref 3.5–5.3)
RBC # BLD AUTO: 2.4 MILLION/UL (ref 3.88–5.62)
SODIUM SERPL-SCNC: 140 MMOL/L (ref 135–147)
WBC # BLD AUTO: 11.24 THOUSAND/UL (ref 4.31–10.16)

## 2023-09-09 PROCEDURE — 84100 ASSAY OF PHOSPHORUS: CPT

## 2023-09-09 PROCEDURE — 85027 COMPLETE CBC AUTOMATED: CPT

## 2023-09-09 PROCEDURE — 83735 ASSAY OF MAGNESIUM: CPT

## 2023-09-09 PROCEDURE — 99232 SBSQ HOSP IP/OBS MODERATE 35: CPT | Performed by: SURGERY

## 2023-09-09 PROCEDURE — 80048 BASIC METABOLIC PNL TOTAL CA: CPT

## 2023-09-09 RX ORDER — LORAZEPAM 2 MG/ML
1 INJECTION INTRAMUSCULAR ONCE
Status: COMPLETED | OUTPATIENT
Start: 2023-09-09 | End: 2023-09-09

## 2023-09-09 RX ADMIN — METHOCARBAMOL 500 MG: 500 TABLET ORAL at 11:30

## 2023-09-09 RX ADMIN — OXYCODONE HYDROCHLORIDE 10 MG: 10 TABLET ORAL at 16:12

## 2023-09-09 RX ADMIN — OXYCODONE HYDROCHLORIDE 10 MG: 10 TABLET ORAL at 11:30

## 2023-09-09 RX ADMIN — HYDROMORPHONE HYDROCHLORIDE 0.5 MG: 1 INJECTION, SOLUTION INTRAMUSCULAR; INTRAVENOUS; SUBCUTANEOUS at 18:30

## 2023-09-09 RX ADMIN — LORAZEPAM 1 MG: 2 INJECTION INTRAMUSCULAR; INTRAVENOUS at 23:13

## 2023-09-09 RX ADMIN — ENOXAPARIN SODIUM 30 MG: 30 INJECTION SUBCUTANEOUS at 08:00

## 2023-09-09 RX ADMIN — METHOCARBAMOL 500 MG: 500 TABLET ORAL at 18:26

## 2023-09-09 RX ADMIN — BACITRACIN ZINC 1 SMALL APPLICATION: 500 OINTMENT TOPICAL at 08:00

## 2023-09-09 RX ADMIN — SENNOSIDES AND DOCUSATE SODIUM 2 TABLET: 50; 8.6 TABLET ORAL at 18:26

## 2023-09-09 RX ADMIN — ACETAMINOPHEN 975 MG: 325 TABLET, FILM COATED ORAL at 21:48

## 2023-09-09 RX ADMIN — METHOCARBAMOL 500 MG: 500 TABLET ORAL at 05:34

## 2023-09-09 RX ADMIN — OXYCODONE HYDROCHLORIDE 10 MG: 10 TABLET ORAL at 04:26

## 2023-09-09 RX ADMIN — BACITRACIN ZINC 1 SMALL APPLICATION: 500 OINTMENT TOPICAL at 18:26

## 2023-09-09 RX ADMIN — ENOXAPARIN SODIUM 30 MG: 30 INJECTION SUBCUTANEOUS at 21:48

## 2023-09-09 RX ADMIN — SENNOSIDES AND DOCUSATE SODIUM 2 TABLET: 50; 8.6 TABLET ORAL at 08:00

## 2023-09-09 RX ADMIN — ACETAMINOPHEN 975 MG: 325 TABLET, FILM COATED ORAL at 13:04

## 2023-09-09 RX ADMIN — ACETAMINOPHEN 975 MG: 325 TABLET, FILM COATED ORAL at 05:34
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
## 2023-09-09 NOTE — QUICK NOTE
Surgery Post-Op Check  Ellyn Cade Serve 35 y.o. male MRN: [id number]  Unit/Bed#: [facility] 092-31 Encounter: [id number]     S: Patient seen after left leg surgery with no complaints. He is denying pain. O:   Vitals:    09/08/23 2214   BP: 120/77   Pulse: 103   Resp:    Temp: 98.3 °F (36.8 °C)   SpO2: 98%     I/O       09/07 0701 09/08 0700 09/08 0701 09/09 0700    P. O. 600 960    I.V. (mL/kg) 577.1 (5.8) 802.2 (8.1)    NG/GT      IV Piggyback 100     Total Intake(mL/kg) 1277.1 (12.9) 1762.2 (17.7)    Urine (mL/kg/hr) 4775 (2) 1580 (1)    Emesis/NG output      Drains  55    Stool 0     Blood  25    Total Output 4775 1660    Net -3497.9 +102.2          Unmeasured Stool Occurrence 2 x         PE:  Gen:  NAD  HENT: MMM  CV:  warm, well-perfused  Lung:  normal effort  Abd:  soft, NT/ND  Ext:  no CCE, drain with serosanguinous output, compartments are soft  Neuro:  A&Ox3, M/S grossly intact, able to move left toes with no loss of sensation     Lab Results   Component Value Date    WBC 11.78 (H) 09/08/2023    HGB 7.6 (L) 09/08/2023    HCT 22.8 (L) 09/08/2023    MCV 91 09/08/2023     (L) 09/08/2023     Lab Results   Component Value Date    GLUCOSE 199 (H) 09/05/2023    CALCIUM 8.3 (L) 09/08/2023    K 4.4 09/08/2023    CO2 28 09/08/2023     (H) 09/08/2023    BUN 5 09/08/2023    CREATININE 0.74 09/08/2023         A/P: 35 y.o. male Day of Surgery s/p Procedure(s) (LRB):  DEBRIDEMENT LOWER EXTREMITY ([address] OUT); FULL WOUND CLOSURE, 19 FR DRAIN PLACEMNT (Left)  • Pain regimen  • Monitor drain output and for signs of compartment syndrome  • Rest of care per daily progress notes      Kody Guerrero MD PGY3

## 2023-09-09 NOTE — ASSESSMENT & PLAN NOTE
- Occurred during induction and release of tourniquet  - ECHO within normal limits  - Monitor vitals

## 2023-09-09 NOTE — QUICK NOTE
Post- OP Note - GeneralSurgery   Ellyn Fontanez 35 y.o. male MRN: [id number]  Unit/Bed#: [facility] 614-01 Encounter: [id number]    Assessment:  35 y.o. male Day of Surgery s/p Procedure(s) (LRB):  DEBRIDEMENT LOWER EXTREMITY ([address] OUT); FULL WOUND CLOSURE, 19 FR DRAIN PLACEMNT (Left)     Subjective/Objective     Subjective:   Patient alert and oriented. Pain controlled. Objective:    Blood pressure 120/77, pulse 103, temperature 98.3 °F (36.8 °C), resp. rate 16, height 5' 10" (1.778 m), weight 99.3 kg (219 lb), SpO2 98 %. ,Body mass index is 31.42 kg/m². Physical Exam:   Gen: NAD. HEENT: MMM  CV: well perfused  Lungs: Normal respiratory effort on RA  Abd: soft , nt, nd  Extremities: dressing cdi , drain intact  Skin: warm/ dry  Neuro:  AxO x3, no motosensory deficits    Please Tigertext on call surgery resident role with any questions
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
## 2023-09-10 ENCOUNTER — APPOINTMENT (INPATIENT)
Dept: RADIOLOGY | Facility: HOSPITAL | Age: 33
DRG: 952 | End: 2023-09-10
Payer: COMMERCIAL

## 2023-09-10 PROBLEM — D72.829 LEUKOCYTOSIS: Status: ACTIVE | Noted: 2023-09-10

## 2023-09-10 LAB
BACTERIA UR QL AUTO: ABNORMAL /HPF
BASOPHILS # BLD AUTO: 0.05 THOUSANDS/ÂΜL (ref 0–0.1)
BASOPHILS NFR BLD AUTO: 0 % (ref 0–1)
BILIRUB UR QL STRIP: NEGATIVE
CLARITY UR: CLEAR
COLOR UR: ABNORMAL
EOSINOPHIL # BLD AUTO: 0.05 THOUSAND/ÂΜL (ref 0–0.61)
EOSINOPHIL NFR BLD AUTO: 0 % (ref 0–6)
ERYTHROCYTE [DISTWIDTH] IN BLOOD BY AUTOMATED COUNT: 13.7 % (ref 11.6–15.1)
GLUCOSE UR STRIP-MCNC: NEGATIVE MG/DL
HCT VFR BLD AUTO: 21.9 % (ref 36.5–49.3)
HGB BLD-MCNC: 7.7 G/DL (ref 12–17)
HGB UR QL STRIP.AUTO: NEGATIVE
IMM GRANULOCYTES # BLD AUTO: 0.15 THOUSAND/UL (ref 0–0.2)
IMM GRANULOCYTES NFR BLD AUTO: 1 % (ref 0–2)
KETONES UR STRIP-MCNC: NEGATIVE MG/DL
LACTATE SERPL-SCNC: 1.1 MMOL/L (ref 0.5–2)
LEUKOCYTE ESTERASE UR QL STRIP: NEGATIVE
LYMPHOCYTES # BLD AUTO: 1.38 THOUSANDS/ÂΜL (ref 0.6–4.47)
LYMPHOCYTES NFR BLD AUTO: 9 % (ref 14–44)
MCH RBC QN AUTO: 31.2 PG (ref 26.8–34.3)
MCHC RBC AUTO-ENTMCNC: 35.2 G/DL (ref 31.4–37.4)
MCV RBC AUTO: 89 FL (ref 82–98)
MONOCYTES # BLD AUTO: 1.32 THOUSAND/ÂΜL (ref 0.17–1.22)
MONOCYTES NFR BLD AUTO: 8 % (ref 4–12)
NEUTROPHILS # BLD AUTO: 13.28 THOUSANDS/ÂΜL (ref 1.85–7.62)
NEUTS SEG NFR BLD AUTO: 82 % (ref 43–75)
NITRITE UR QL STRIP: NEGATIVE
NON-SQ EPI CELLS URNS QL MICRO: ABNORMAL /HPF
NRBC BLD AUTO-RTO: 0 /100 WBCS
PH UR STRIP.AUTO: 6 [PH]
PLATELET # BLD AUTO: 244 THOUSANDS/UL (ref 149–390)
PMV BLD AUTO: 9.4 FL (ref 8.9–12.7)
PROT UR STRIP-MCNC: NEGATIVE MG/DL
RBC # BLD AUTO: 2.47 MILLION/UL (ref 3.88–5.62)
RBC #/AREA URNS AUTO: ABNORMAL /HPF
SP GR UR STRIP.AUTO: 1.01 (ref 1–1.03)
UROBILINOGEN UR STRIP-ACNC: 4 MG/DL
WBC # BLD AUTO: 16.23 THOUSAND/UL (ref 4.31–10.16)
WBC #/AREA URNS AUTO: ABNORMAL /HPF

## 2023-09-10 PROCEDURE — 87040 BLOOD CULTURE FOR BACTERIA: CPT | Performed by: PHYSICIAN ASSISTANT

## 2023-09-10 PROCEDURE — 71045 X-RAY EXAM CHEST 1 VIEW: CPT

## 2023-09-10 PROCEDURE — 99232 SBSQ HOSP IP/OBS MODERATE 35: CPT | Performed by: SURGERY

## 2023-09-10 PROCEDURE — 83605 ASSAY OF LACTIC ACID: CPT | Performed by: PHYSICIAN ASSISTANT

## 2023-09-10 PROCEDURE — 97530 THERAPEUTIC ACTIVITIES: CPT

## 2023-09-10 PROCEDURE — 85025 COMPLETE CBC W/AUTO DIFF WBC: CPT | Performed by: PHYSICIAN ASSISTANT

## 2023-09-10 PROCEDURE — 81001 URINALYSIS AUTO W/SCOPE: CPT | Performed by: PHYSICIAN ASSISTANT

## 2023-09-10 RX ADMIN — METHOCARBAMOL 500 MG: 500 TABLET ORAL at 13:25

## 2023-09-10 RX ADMIN — ACETAMINOPHEN 975 MG: 325 TABLET, FILM COATED ORAL at 13:25

## 2023-09-10 RX ADMIN — BACITRACIN ZINC 1 SMALL APPLICATION: 500 OINTMENT TOPICAL at 17:57

## 2023-09-10 RX ADMIN — BACITRACIN ZINC 1 SMALL APPLICATION: 500 OINTMENT TOPICAL at 08:10

## 2023-09-10 RX ADMIN — METHOCARBAMOL 500 MG: 500 TABLET ORAL at 05:49

## 2023-09-10 RX ADMIN — OXYCODONE HYDROCHLORIDE 10 MG: 10 TABLET ORAL at 04:25

## 2023-09-10 RX ADMIN — ACETAMINOPHEN 975 MG: 325 TABLET, FILM COATED ORAL at 22:26

## 2023-09-10 RX ADMIN — SENNOSIDES AND DOCUSATE SODIUM 2 TABLET: 50; 8.6 TABLET ORAL at 17:57

## 2023-09-10 RX ADMIN — ENOXAPARIN SODIUM 30 MG: 30 INJECTION SUBCUTANEOUS at 08:10

## 2023-09-10 RX ADMIN — METHOCARBAMOL 500 MG: 500 TABLET ORAL at 17:57

## 2023-09-10 RX ADMIN — METHOCARBAMOL 500 MG: 500 TABLET ORAL at 00:05

## 2023-09-10 RX ADMIN — OXYCODONE HYDROCHLORIDE 5 MG: 5 TABLET ORAL at 17:57

## 2023-09-10 RX ADMIN — OXYCODONE HYDROCHLORIDE 5 MG: 5 TABLET ORAL at 13:25

## 2023-09-10 RX ADMIN — ACETAMINOPHEN 975 MG: 325 TABLET, FILM COATED ORAL at 05:49

## 2023-09-10 RX ADMIN — ENOXAPARIN SODIUM 30 MG: 30 INJECTION SUBCUTANEOUS at 20:15

## 2023-09-10 RX ADMIN — SENNOSIDES AND DOCUSATE SODIUM 2 TABLET: 50; 8.6 TABLET ORAL at 08:10
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
## 2023-09-10 NOTE — PHYSICAL THERAPY NOTE
Physical Therapy Treatment Note    Patient's Name: Ghassan Braden  : 1990       09/10/23 1159   PT Last Visit   PT Visit Date 09/10/23   Note Type   Note Type Treatment   Pain Assessment   Pain Assessment Tool 0-10   Pain Score 7   Pain Location/Orientation Orientation: Left; Location: Leg   Hospital Pain Intervention(s) Repositioned; Ambulation/increased activity; Elevated   Restrictions/Precautions   Weight Bearing Precautions Per Order Yes   RUE Weight Bearing Per Order NWB   Braces or Orthoses Sling   Other Precautions Chair Alarm; Bed Alarm;WBS;Fall Risk;Pain;Multiple lines  (THA drain)   General   Chart Reviewed Yes   Family/Caregiver Present No   Cognition   Overall Cognitive Status WFL   Arousal/Participation Alert   Attention Within functional limits   Orientation Level Oriented X4   Following Commands Follows multistep commands inconsistently   Comments Pt requires cues to attend to task and to maintain NWB status appropriately. Bed Mobility   Supine to Sit 3  Moderate assistance   Additional items Assist x 1; Increased time required;Verbal cues   Additional Comments Pt with difficulty maintaining NWB status R UE, extensive education on importance for protection, pt with significant R lateral lean, difficulty with maintaining COG. Transfers   Sit to Stand 4  Minimal assistance   Additional items Assist x 1   Stand to Sit 4  Minimal assistance   Additional items Assist x 1   Additional Comments Pt requires CGA to stand, once in standing, attempted use of L sided axillary crutch, however after approx 1 minute standing, pt reported dizziness, returned to sitting EOB and symptoms improved. Pt able to perform stand pivot transfer to bedside chair with CGA, cues for safety and awareness, BP in chair 84/40, legs elevated, BP recovered, RN made aware. Pt encouraged to sit OOB throughout the day to improve tolerance to upright.   PT demonstrated use of axillary crutch for ambulation and educated on sequencing of stair climbing. Balance   Static Sitting Fair   Dynamic Sitting Fair -   Static Standing Fair -   Dynamic Standing Poor +   Ambulatory Fair -   Activity Tolerance   Activity Tolerance Patient limited by fatigue  (dizziness)   Nurse Made Aware RN updated. Chair alarm engaged   Assessment   Prognosis Fair   Problem List Decreased strength;Decreased range of motion;Decreased endurance; Impaired balance;Decreased mobility; Decreased safety awareness;Orthopedic restrictions;Pain   Assessment Pt with limited standing tolerance secondary to dizziness, hypotensive. Pt educated on single axillary crutch use for ambulation and sequencing of stair climbing with use of HR for entrance to home. Anticipate with improved vitals, pt will be able to ambulate with axillary crutch, good balance observed in standing. Recommend home with family care and OP PT upon hospital D/C. Goals   Patient Goals to get out of bed   STG Expiration Date 09/21/23   PT Treatment Day 1   Plan   Treatment/Interventions LE strengthening/ROM; Functional transfer training; Therapeutic exercise;Elevations; Endurance training;Patient/family training;Equipment eval/education; Bed mobility;Gait training   Progress Progressing toward goals   PT Frequency 3-5x/wk   Recommendation   PT Discharge Recommendation Home with outpatient rehabilitation   Equipment Recommended Crutches   AM-PAC Basic Mobility Inpatient   Turning in Flat Bed Without Bedrails 4   Lying on Back to Sitting on Edge of Flat Bed Without Bedrails 2   Moving Bed to Chair 3   Standing Up From Chair Using Arms 3   Walk in Room 2   Climb 3-5 Stairs With Railing 2   Basic Mobility Inpatient Raw Score 16   Basic Mobility Standardized Score 38.32   Highest Level Of Mobility   JH-HLM Goal 5: Stand one or more mins   JH-HLM Achieved 5: Stand (1 or more minutes)       Chilel Ode, PT, DPT, GCS
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
## 2023-09-10 NOTE — ASSESSMENT & PLAN NOTE
- Leukocytosis up to 16  - All wounds addressed on 9/10 of which are stable  - CXR reviewed  - UA negative  - Will trend labs in AM and determine possible CT scan  - Lactic negative  - Cultures pending

## 2023-09-10 NOTE — PLAN OF CARE
Problem: PHYSICAL THERAPY ADULT  Goal: Performs mobility at highest level of function for planned discharge setting. See evaluation for individualized goals. Description: Treatment/Interventions: ADL retraining, Functional transfer training, LE strengthening/ROM, Elevations, Therapeutic exercise, Endurance training, Patient/family training, Equipment eval/education, Bed mobility, Gait training, Compensatory technique education, Spoke to nursing, Spoke to case management, Spoke to advanced practitioner, OT  Equipment Recommended:  (TBD w/ progress)       See flowsheet documentation for full assessment, interventions and recommendations. Outcome: Progressing  Note: Prognosis: Fair  Problem List: Decreased strength, Decreased range of motion, Decreased endurance, Impaired balance, Decreased mobility, Decreased safety awareness, Orthopedic restrictions, Pain  Assessment: Pt with limited standing tolerance secondary to dizziness, hypotensive. Pt educated on single axillary crutch use for ambulation and sequencing of stair climbing with use of HR for entrance to home. Anticipate with improved vitals, pt will be able to ambulate with axillary crutch, good balance observed in standing. Recommend home with family care and OP PT upon hospital D/C. Barriers to Discharge: Inaccessible home environment     PT Discharge Recommendation: Home with outpatient rehabilitation    See flowsheet documentation for full assessment.

## 2023-09-11 ENCOUNTER — APPOINTMENT (INPATIENT)
Dept: RADIOLOGY | Facility: HOSPITAL | Age: 33
DRG: 952 | End: 2023-09-11
Payer: COMMERCIAL

## 2023-09-11 LAB
ANION GAP SERPL CALCULATED.3IONS-SCNC: 7 MMOL/L
BASOPHILS # BLD AUTO: 0.04 THOUSANDS/ÂΜL (ref 0–0.1)
BASOPHILS NFR BLD AUTO: 0 % (ref 0–1)
BUN SERPL-MCNC: 9 MG/DL (ref 5–25)
CALCIUM SERPL-MCNC: 9.1 MG/DL (ref 8.4–10.2)
CHLORIDE SERPL-SCNC: 103 MMOL/L (ref 96–108)
CO2 SERPL-SCNC: 29 MMOL/L (ref 21–32)
CREAT SERPL-MCNC: 0.73 MG/DL (ref 0.6–1.3)
EOSINOPHIL # BLD AUTO: 0.06 THOUSAND/ÂΜL (ref 0–0.61)
EOSINOPHIL NFR BLD AUTO: 0 % (ref 0–6)
ERYTHROCYTE [DISTWIDTH] IN BLOOD BY AUTOMATED COUNT: 15 % (ref 11.6–15.1)
GFR SERPL CREATININE-BSD FRML MDRD: 121 ML/MIN/1.73SQ M
GLUCOSE SERPL-MCNC: 116 MG/DL (ref 65–140)
HCT VFR BLD AUTO: 21.4 % (ref 36.5–49.3)
HGB BLD-MCNC: 7.3 G/DL (ref 12–17)
IMM GRANULOCYTES # BLD AUTO: 0.3 THOUSAND/UL (ref 0–0.2)
IMM GRANULOCYTES NFR BLD AUTO: 2 % (ref 0–2)
LYMPHOCYTES # BLD AUTO: 1.87 THOUSANDS/ÂΜL (ref 0.6–4.47)
LYMPHOCYTES NFR BLD AUTO: 11 % (ref 14–44)
MAGNESIUM SERPL-MCNC: 2 MG/DL (ref 1.9–2.7)
MCH RBC QN AUTO: 30.7 PG (ref 26.8–34.3)
MCHC RBC AUTO-ENTMCNC: 34.1 G/DL (ref 31.4–37.4)
MCV RBC AUTO: 90 FL (ref 82–98)
MONOCYTES # BLD AUTO: 1.39 THOUSAND/ÂΜL (ref 0.17–1.22)
MONOCYTES NFR BLD AUTO: 8 % (ref 4–12)
NEUTROPHILS # BLD AUTO: 14.1 THOUSANDS/ÂΜL (ref 1.85–7.62)
NEUTS SEG NFR BLD AUTO: 79 % (ref 43–75)
NRBC BLD AUTO-RTO: 0 /100 WBCS
PHOSPHATE SERPL-MCNC: 3.1 MG/DL (ref 2.7–4.5)
PLATELET # BLD AUTO: 307 THOUSANDS/UL (ref 149–390)
PMV BLD AUTO: 9.8 FL (ref 8.9–12.7)
POTASSIUM SERPL-SCNC: 3.5 MMOL/L (ref 3.5–5.3)
RBC # BLD AUTO: 2.38 MILLION/UL (ref 3.88–5.62)
SODIUM SERPL-SCNC: 139 MMOL/L (ref 135–147)
WBC # BLD AUTO: 17.76 THOUSAND/UL (ref 4.31–10.16)

## 2023-09-11 PROCEDURE — 83735 ASSAY OF MAGNESIUM: CPT | Performed by: PHYSICIAN ASSISTANT

## 2023-09-11 PROCEDURE — 99232 SBSQ HOSP IP/OBS MODERATE 35: CPT | Performed by: STUDENT IN AN ORGANIZED HEALTH CARE EDUCATION/TRAINING PROGRAM

## 2023-09-11 PROCEDURE — 84100 ASSAY OF PHOSPHORUS: CPT | Performed by: PHYSICIAN ASSISTANT

## 2023-09-11 PROCEDURE — 73701 CT LOWER EXTREMITY W/DYE: CPT

## 2023-09-11 PROCEDURE — 85025 COMPLETE CBC W/AUTO DIFF WBC: CPT | Performed by: PHYSICIAN ASSISTANT

## 2023-09-11 PROCEDURE — 99254 IP/OBS CNSLTJ NEW/EST MOD 60: CPT | Performed by: GENERAL PRACTICE

## 2023-09-11 PROCEDURE — 74177 CT ABD & PELVIS W/CONTRAST: CPT

## 2023-09-11 PROCEDURE — G1004 CDSM NDSC: HCPCS

## 2023-09-11 PROCEDURE — 80048 BASIC METABOLIC PNL TOTAL CA: CPT | Performed by: PHYSICIAN ASSISTANT

## 2023-09-11 RX ORDER — CLONIDINE HYDROCHLORIDE 0.1 MG/1
0.1 TABLET ORAL
Status: DISCONTINUED | OUTPATIENT
Start: 2023-09-11 | End: 2023-09-13

## 2023-09-11 RX ORDER — HYDROMORPHONE HCL/PF 1 MG/ML
0.5 SYRINGE (ML) INJECTION ONCE
Status: DISCONTINUED | OUTPATIENT
Start: 2023-09-11 | End: 2023-09-13

## 2023-09-11 RX ADMIN — METHOCARBAMOL 500 MG: 500 TABLET ORAL at 13:27

## 2023-09-11 RX ADMIN — IOHEXOL 100 ML: 350 INJECTION, SOLUTION INTRAVENOUS at 13:08

## 2023-09-11 RX ADMIN — METHOCARBAMOL 500 MG: 500 TABLET ORAL at 00:03

## 2023-09-11 RX ADMIN — ACETAMINOPHEN 975 MG: 325 TABLET, FILM COATED ORAL at 06:03

## 2023-09-11 RX ADMIN — METHOCARBAMOL 500 MG: 500 TABLET ORAL at 18:17

## 2023-09-11 RX ADMIN — CLONIDINE HYDROCHLORIDE 0.1 MG: 0.1 TABLET ORAL at 21:41

## 2023-09-11 RX ADMIN — OXYCODONE HYDROCHLORIDE 10 MG: 10 TABLET ORAL at 16:21

## 2023-09-11 RX ADMIN — METHOCARBAMOL 500 MG: 500 TABLET ORAL at 06:03

## 2023-09-11 RX ADMIN — ACETAMINOPHEN 975 MG: 325 TABLET, FILM COATED ORAL at 13:27

## 2023-09-11 RX ADMIN — BACITRACIN ZINC 1 SMALL APPLICATION: 500 OINTMENT TOPICAL at 08:38

## 2023-09-11 RX ADMIN — ACETAMINOPHEN 975 MG: 325 TABLET, FILM COATED ORAL at 21:41

## 2023-09-11 RX ADMIN — ONDANSETRON 4 MG: 2 INJECTION INTRAMUSCULAR; INTRAVENOUS at 21:59

## 2023-09-11 RX ADMIN — SENNOSIDES AND DOCUSATE SODIUM 2 TABLET: 50; 8.6 TABLET ORAL at 18:17

## 2023-09-11 RX ADMIN — OXYCODONE HYDROCHLORIDE 10 MG: 10 TABLET ORAL at 21:44

## 2023-09-11 RX ADMIN — ENOXAPARIN SODIUM 30 MG: 30 INJECTION SUBCUTANEOUS at 08:38

## 2023-09-11 NOTE — PLAN OF CARE
Problem: SAFETY,RESTRAINT: NV/NON-SELF DESTRUCTIVE BEHAVIOR  Goal: Remains free of harm/injury (restraint for non violent/non self-detsructive behavior)  Description: INTERVENTIONS:  - Instruct patient/family regarding restraint use   - Assess and monitor physiologic and psychological status   - Provide interventions and comfort measures to meet assessed patient needs   - Identify and implement measures to help patient regain control  - Assess readiness for release of restraint   Outcome: Progressing     Problem: SAFETY,RESTRAINT: NV/NON-SELF DESTRUCTIVE BEHAVIOR  Goal: Returns to optimal restraint-free functioning  Description: INTERVENTIONS:  - Assess the patient's behavior and symptoms that indicate continued need for restraint  - Identify and implement measures to help patient regain control  - Assess readiness for release of restraint   Outcome: Progressing     Problem: INFECTION - ADULT  Goal: Absence or prevention of progression during hospitalization  Description: INTERVENTIONS:  - Assess and monitor for signs and symptoms of infection  - Monitor lab/diagnostic results  - Monitor all insertion sites, i.e. indwelling lines, tubes, and drains  - Monitor endotracheal if appropriate and nasal secretions for changes in amount and color  - Smyer appropriate cooling/warming therapies per order  - Administer medications as ordered  - Instruct and encourage patient and family to use good hand hygiene technique  - Identify and instruct in appropriate isolation precautions for identified infection/condition  Outcome: Progressing     Problem: SAFETY ADULT  Goal: Maintains/Returns to pre admission functional level  Description: INTERVENTIONS:  - Perform BMAT or MOVE assessment daily.   - Set and communicate daily mobility goal to care team and patient/family/caregiver.    - Collaborate with rehabilitation services on mobility goals if consulted    - Out of bed for toileting  - Record patient progress and toleration of activity level   Outcome: Progressing     Problem: MUSCULOSKELETAL - ADULT  Goal: Maintain or return mobility to safest level of function  Description: INTERVENTIONS:  - Assess patient's ability to carry out ADLs; assess patient's baseline for ADL function and identify physical deficits which impact ability to perform ADLs (bathing, care of mouth/teeth, toileting, grooming, dressing, etc.)  - Assess/evaluate cause of self-care deficits   - Assess range of motion  - Assess patient's mobility  - Assess patient's need for assistive devices and provide as appropriate  - Encourage maximum independence but intervene and supervise when necessary  - Involve family in performance of ADLs  - Assess for home care needs following discharge   - Consider OT consult to assist with ADL evaluation and planning for discharge  - Provide patient education as appropriate  Outcome: Progressing

## 2023-09-11 NOTE — ASSESSMENT & PLAN NOTE
- Occurred during induction and release of tourniquet during initial operative repair  - ECHO within normal limits  - Monitor vitals
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
## 2023-09-11 NOTE — ASSESSMENT & PLAN NOTE
- Leukocytosis up to 16 now 17  - All wounds addressed on 9/10 of which are stable  - CXR reviewed -- no disease  - UA negative   - Will trend labs in AM and determine possible CT scan  - Lactic negative on 9/10  - Cultures pending  - Fever curve improved
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
## 2023-09-12 ENCOUNTER — APPOINTMENT (INPATIENT)
Dept: RADIOLOGY | Facility: HOSPITAL | Age: 33
DRG: 952 | End: 2023-09-12
Attending: STUDENT IN AN ORGANIZED HEALTH CARE EDUCATION/TRAINING PROGRAM
Payer: COMMERCIAL

## 2023-09-12 PROBLEM — F43.0 ACUTE STRESS REACTION: Status: ACTIVE | Noted: 2023-09-12

## 2023-09-12 LAB
ALBUMIN SERPL BCP-MCNC: 3.8 G/DL (ref 3.5–5)
ALP SERPL-CCNC: 42 U/L (ref 34–104)
ALT SERPL W P-5'-P-CCNC: 57 U/L (ref 7–52)
ANION GAP SERPL CALCULATED.3IONS-SCNC: 8 MMOL/L
AST SERPL W P-5'-P-CCNC: 46 U/L (ref 13–39)
BASOPHILS # BLD AUTO: 0.06 THOUSANDS/ÂΜL (ref 0–0.1)
BASOPHILS NFR BLD AUTO: 0 % (ref 0–1)
BILIRUB DIRECT SERPL-MCNC: 0.16 MG/DL (ref 0–0.2)
BILIRUB SERPL-MCNC: 0.71 MG/DL (ref 0.2–1)
BUN SERPL-MCNC: 12 MG/DL (ref 5–25)
CALCIUM SERPL-MCNC: 8.8 MG/DL (ref 8.4–10.2)
CHLORIDE SERPL-SCNC: 101 MMOL/L (ref 96–108)
CO2 SERPL-SCNC: 27 MMOL/L (ref 21–32)
CREAT SERPL-MCNC: 0.71 MG/DL (ref 0.6–1.3)
EOSINOPHIL # BLD AUTO: 0.11 THOUSAND/ÂΜL (ref 0–0.61)
EOSINOPHIL NFR BLD AUTO: 1 % (ref 0–6)
ERYTHROCYTE [DISTWIDTH] IN BLOOD BY AUTOMATED COUNT: 14.7 % (ref 11.6–15.1)
FLUAV RNA RESP QL NAA+PROBE: NEGATIVE
FLUBV RNA RESP QL NAA+PROBE: NEGATIVE
GFR SERPL CREATININE-BSD FRML MDRD: 123 ML/MIN/1.73SQ M
GLUCOSE SERPL-MCNC: 108 MG/DL (ref 65–140)
HCT VFR BLD AUTO: 21.9 % (ref 36.5–49.3)
HGB BLD-MCNC: 7.5 G/DL (ref 12–17)
IMM GRANULOCYTES # BLD AUTO: 0.3 THOUSAND/UL (ref 0–0.2)
IMM GRANULOCYTES NFR BLD AUTO: 2 % (ref 0–2)
LYMPHOCYTES # BLD AUTO: 2.45 THOUSANDS/ÂΜL (ref 0.6–4.47)
LYMPHOCYTES NFR BLD AUTO: 12 % (ref 14–44)
MAGNESIUM SERPL-MCNC: 2.2 MG/DL (ref 1.9–2.7)
MCH RBC QN AUTO: 30.5 PG (ref 26.8–34.3)
MCHC RBC AUTO-ENTMCNC: 34.2 G/DL (ref 31.4–37.4)
MCV RBC AUTO: 89 FL (ref 82–98)
MONOCYTES # BLD AUTO: 1.6 THOUSAND/ÂΜL (ref 0.17–1.22)
MONOCYTES NFR BLD AUTO: 8 % (ref 4–12)
NEUTROPHILS # BLD AUTO: 15.6 THOUSANDS/ÂΜL (ref 1.85–7.62)
NEUTS SEG NFR BLD AUTO: 77 % (ref 43–75)
NRBC BLD AUTO-RTO: 1 /100 WBCS
PHOSPHATE SERPL-MCNC: 4.3 MG/DL (ref 2.7–4.5)
PLATELET # BLD AUTO: 382 THOUSANDS/UL (ref 149–390)
PMV BLD AUTO: 9.4 FL (ref 8.9–12.7)
POTASSIUM SERPL-SCNC: 4 MMOL/L (ref 3.5–5.3)
PROCALCITONIN SERPL-MCNC: 0.17 NG/ML
PROT SERPL-MCNC: 6.5 G/DL (ref 6.4–8.4)
RBC # BLD AUTO: 2.46 MILLION/UL (ref 3.88–5.62)
RSV RNA RESP QL NAA+PROBE: NEGATIVE
SARS-COV-2 RNA RESP QL NAA+PROBE: NEGATIVE
SODIUM SERPL-SCNC: 136 MMOL/L (ref 135–147)
WBC # BLD AUTO: 20.12 THOUSAND/UL (ref 4.31–10.16)

## 2023-09-12 PROCEDURE — 80048 BASIC METABOLIC PNL TOTAL CA: CPT | Performed by: PHYSICIAN ASSISTANT

## 2023-09-12 PROCEDURE — 80076 HEPATIC FUNCTION PANEL: CPT | Performed by: STUDENT IN AN ORGANIZED HEALTH CARE EDUCATION/TRAINING PROGRAM

## 2023-09-12 PROCEDURE — 97116 GAIT TRAINING THERAPY: CPT

## 2023-09-12 PROCEDURE — 84145 PROCALCITONIN (PCT): CPT | Performed by: NURSE PRACTITIONER

## 2023-09-12 PROCEDURE — 97535 SELF CARE MNGMENT TRAINING: CPT

## 2023-09-12 PROCEDURE — 71046 X-RAY EXAM CHEST 2 VIEWS: CPT

## 2023-09-12 PROCEDURE — 94668 MNPJ CHEST WALL SBSQ: CPT

## 2023-09-12 PROCEDURE — 0241U HB NFCT DS VIR RESP RNA 4 TRGT: CPT | Performed by: PHYSICIAN ASSISTANT

## 2023-09-12 PROCEDURE — 83735 ASSAY OF MAGNESIUM: CPT | Performed by: PHYSICIAN ASSISTANT

## 2023-09-12 PROCEDURE — 94664 DEMO&/EVAL PT USE INHALER: CPT

## 2023-09-12 PROCEDURE — 94760 N-INVAS EAR/PLS OXIMETRY 1: CPT

## 2023-09-12 PROCEDURE — 99024 POSTOP FOLLOW-UP VISIT: CPT | Performed by: SURGERY

## 2023-09-12 PROCEDURE — 99232 SBSQ HOSP IP/OBS MODERATE 35: CPT | Performed by: STUDENT IN AN ORGANIZED HEALTH CARE EDUCATION/TRAINING PROGRAM

## 2023-09-12 PROCEDURE — 97530 THERAPEUTIC ACTIVITIES: CPT

## 2023-09-12 PROCEDURE — 85025 COMPLETE CBC W/AUTO DIFF WBC: CPT | Performed by: PHYSICIAN ASSISTANT

## 2023-09-12 PROCEDURE — 84100 ASSAY OF PHOSPHORUS: CPT | Performed by: PHYSICIAN ASSISTANT

## 2023-09-12 RX ORDER — CALCIUM CARBONATE 500 MG/1
500 TABLET, CHEWABLE ORAL DAILY PRN
Status: DISCONTINUED | OUTPATIENT
Start: 2023-09-12 | End: 2023-09-15 | Stop reason: HOSPADM

## 2023-09-12 RX ORDER — PANTOPRAZOLE SODIUM 40 MG/1
40 TABLET, DELAYED RELEASE ORAL
Status: DISCONTINUED | OUTPATIENT
Start: 2023-09-12 | End: 2023-09-15 | Stop reason: HOSPADM

## 2023-09-12 RX ADMIN — ENOXAPARIN SODIUM 30 MG: 30 INJECTION SUBCUTANEOUS at 21:56

## 2023-09-12 RX ADMIN — ACETAMINOPHEN 975 MG: 325 TABLET, FILM COATED ORAL at 13:35

## 2023-09-12 RX ADMIN — BACITRACIN ZINC 1 SMALL APPLICATION: 500 OINTMENT TOPICAL at 17:52

## 2023-09-12 RX ADMIN — METHOCARBAMOL 500 MG: 500 TABLET ORAL at 05:15

## 2023-09-12 RX ADMIN — PANTOPRAZOLE SODIUM 40 MG: 40 TABLET, DELAYED RELEASE ORAL at 05:15

## 2023-09-12 RX ADMIN — METHOCARBAMOL 500 MG: 500 TABLET ORAL at 23:52

## 2023-09-12 RX ADMIN — METHOCARBAMOL 500 MG: 500 TABLET ORAL at 17:52

## 2023-09-12 RX ADMIN — METHOCARBAMOL 500 MG: 500 TABLET ORAL at 00:39

## 2023-09-12 RX ADMIN — ACETAMINOPHEN 975 MG: 325 TABLET, FILM COATED ORAL at 21:56

## 2023-09-12 RX ADMIN — ENOXAPARIN SODIUM 30 MG: 30 INJECTION SUBCUTANEOUS at 12:14

## 2023-09-12 RX ADMIN — BACITRACIN ZINC 1 SMALL APPLICATION: 500 OINTMENT TOPICAL at 09:30

## 2023-09-12 RX ADMIN — CALCIUM CARBONATE (ANTACID) CHEW TAB 500 MG 500 MG: 500 CHEW TAB at 04:05

## 2023-09-12 RX ADMIN — METHOCARBAMOL 500 MG: 500 TABLET ORAL at 11:01

## 2023-09-12 RX ADMIN — CLONIDINE HYDROCHLORIDE 0.1 MG: 0.1 TABLET ORAL at 21:56

## 2023-09-12 RX ADMIN — OXYCODONE HYDROCHLORIDE 10 MG: 10 TABLET ORAL at 17:52

## 2023-09-12 RX ADMIN — ACETAMINOPHEN 975 MG: 325 TABLET, FILM COATED ORAL at 05:15

## 2023-09-12 NOTE — PHYSICAL THERAPY NOTE
Physical Therapy Progress Note     09/12/23 1121   PT Last Visit   PT Visit Date 09/12/23   Note Type   Note Type Treatment   Pain Assessment   Pain Assessment Tool FLACC   Pain Rating: FLACC (Rest) - Face 1   Pain Rating: FLACC (Rest) - Legs 0   Pain Rating: FLACC (Rest) - Activity 0   Pain Rating: FLACC (Rest) - Cry 1   Pain Rating: FLACC (Rest) - Consolability 0   Score: FLACC (Rest) 2   Pain Rating: FLACC (Activity) - Face 2   Pain Rating: FLACC (Activity) - Legs 1   Pain Rating: FLACC (Activity) - Activity 1   Pain Rating: FLACC (Activity) - Cry 1   Pain Rating: FLACC (Activity) - Consolability 1   Score: FLACC (Activity) 6   Restrictions/Precautions   RUE Weight Bearing Per Order NWB   Braces or Orthoses Sling   Other Precautions Pain; Fall Risk;WBS;Multiple lines  (THA drain LLE)   Subjective   Subjective Pt encountered supine in bed, pleasant and motivated to participate in mobility. Transport arrived prior to initiation of OOB tasks, but pt agreeable to walk to stretcher. Has pain in LLE, but no change in status with activity. Bed Mobility   Supine to Sit 3  Moderate assistance   Additional items Assist x 1; Increased time required;LE management   Sit to Supine 3  Moderate assistance   Additional items Assist x 1; Increased time required;LE management  (onto stretcher)   Transfers   Sit to Stand 3  Moderate assistance   Additional items Assist x 1; Armrests; Increased time required   Stand to Sit 3  Moderate assistance   Additional items Assist x 1; Armrests; Increased time required;Verbal cues   Ambulation/Elevation   Gait pattern Step to;Short stride; Foward flexed; Inconsistent kris; Shuffling;Decreased L stance;Decreased foot clearance; Forward Flexion; Improper Weight shift; Poor UE support; Antalgic   Gait Assistance 3  Moderate assist   Additional items Assist x 1  (+ standby)   Assistive Device Axillary crutches  (LUE)   Distance 10' bed to stretcher   Balance   Static Sitting Fair   Static Standing Fair - Ambulatory Poor +   Activity Tolerance   Activity Tolerance Patient tolerated treatment well;Patient limited by pain   Nurse [facility] [address], RN   Assessment   Prognosis Fair   Problem List Decreased strength;Decreased range of motion;Decreased endurance; Impaired balance;Decreased mobility; Decreased safety awareness;Orthopedic restrictions;Pain   Assessment Pt demonstrated improved endurance compared to previous session, with him performing all transfers with Ax1, then ambulating short distances within bedroom towards stretcher. He Remains limited overall comapred to baseline by acute injuries, resultant in LLE pain & swelling that affects strength & L knee AROM. anticipate this will improve steadily with repetition & daily mobility as tolerated, working towards returning to independence at this time & long term return to PLOF. Pt will need to increase ambulatory distances with decreased assist and negotiate 3-4 steps to enter home. Barriers to Discharge Inaccessible home environment   Barriers to Discharge Comments increased ambulation with decreased assist & 3-4 ADALGISA home   Goals   Patient Goals to keep getting better   STG Expiration Date 09/21/23   PT Treatment Day 2   Plan   Treatment/Interventions Functional transfer training;LE strengthening/ROM; Elevations; Therapeutic exercise; Endurance training;Patient/family training;Equipment eval/education; Bed mobility;Gait training   Progress Progressing toward goals   PT Frequency 3-5x/wk   Recommendation   PT Discharge Recommendation Home with outpatient rehabilitation   Equipment Recommended Crutches  (LUE)   AM-PAC Basic Mobility Inpatient   Turning in Flat Bed Without Bedrails 4   Lying on Back to Sitting on Edge of Flat Bed Without Bedrails 2   Moving Bed to Chair 2   Standing Up From Chair Using Arms 2   Walk in Room 2   Climb 3-5 Stairs With Railing 2   Basic Mobility Inpatient Raw Score 14   Basic Mobility Standardized Score 35.55   Highest Level Of Mobility   JH-HLM Goal 4: Move to chair/commode   JH-HLM Achieved 6: Walk 10 steps or more     Nikc Areas, PTA    [facility] Basic Mobility Raw Score less than 17 suggests pt would benefit from post acute rehab. Please also refer to the recommendation of the Physical Therapist for safe discharge planning.
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
## 2023-09-12 NOTE — PLAN OF CARE
Problem: OCCUPATIONAL THERAPY ADULT  Goal: Performs self-care activities at highest level of function for planned discharge setting. See evaluation for individualized goals. Outcome: Progressing  Note: Limitation: Decreased ADL status, Decreased self-care trans, Decreased high-level ADLs, Decreased endurance     Assessment: pt participated in am ot session and ws seen focusing on activity tolernece, bed mobility sit to from stand and functional mobility using one crutch. pt without reports of dizzines pt was able to walk distance to bathroom with much time. pt required mod asst x 1 for in and out of bed and for functional transfers and mobility pt required incrased time for walking secondary to pain and stiffness. pt with drain l thigh. pt with poor compliance with nwbing and sline wearing r ue. noted to lift arms about head in stretching motion. vc's provided. pt is pleasant and cooperative this session. will follow for furhter adl tasks.      OT Discharge Recommendation: No rehabilitation needs        April A Storm

## 2023-09-12 NOTE — PLAN OF CARE
Problem: SAFETY,RESTRAINT: NV/NON-SELF DESTRUCTIVE BEHAVIOR  Goal: Remains free of harm/injury (restraint for non violent/non self-detsructive behavior)  Description: INTERVENTIONS:  - Instruct patient/family regarding restraint use   - Assess and monitor physiologic and psychological status   - Provide interventions and comfort measures to meet assessed patient needs   - Identify and implement measures to help patient regain control  - Assess readiness for release of restraint   Outcome: Progressing  Goal: Returns to optimal restraint-free functioning  Description: INTERVENTIONS:  - Assess the patient's behavior and symptoms that indicate continued need for restraint  - Identify and implement measures to help patient regain control  - Assess readiness for release of restraint   Outcome: Progressing     Problem: PAIN - ADULT  Goal: Verbalizes/displays adequate comfort level or baseline comfort level  Description: Interventions:  - Encourage patient to monitor pain and request assistance  - Assess pain using appropriate pain scale  - Administer analgesics based on type and severity of pain and evaluate response  - Implement non-pharmacological measures as appropriate and evaluate response  - Consider cultural and social influences on pain and pain management  - Notify physician/advanced practitioner if interventions unsuccessful or patient reports new pain  Outcome: Progressing     Problem: INFECTION - ADULT  Goal: Absence or prevention of progression during hospitalization  Description: INTERVENTIONS:  - Assess and monitor for signs and symptoms of infection  - Monitor lab/diagnostic results  - Monitor all insertion sites, i.e. indwelling lines, tubes, and drains  - Monitor endotracheal if appropriate and nasal secretions for changes in amount and color  - [place] appropriate cooling/warming therapies per order  - Administer medications as ordered  - Instruct and encourage patient and family to use good hand hygiene technique  - Identify and instruct in appropriate isolation precautions for identified infection/condition  Outcome: Progressing  Goal: Absence of fever/infection during neutropenic period  Description: INTERVENTIONS:  - Monitor WBC    Outcome: Progressing
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
## 2023-09-12 NOTE — ASSESSMENT & PLAN NOTE
- Right Shoulder dislocation, present on admission.  - Status post reduction  - Appreciate Orthopedic surgery evaluation, recommendations and interventions as noted. - Maintain non weightbearing status on the right upper extremity in sling.  - Monitor right upper extremity neurovascular exam.  - Continue multimodal analgesic regimen.  - Continue DVT prophylaxis. - PT and OT evaluation and treatment as indicated. - Outpatient follow up with Orthopedic surgery for re-evaluation.    THO RUE in sling

## 2023-09-12 NOTE — ASSESSMENT & PLAN NOTE
- Leukocytosis up to 16 -- 17 -- 20 -- 14.85  - All wounds addressed on 9/11 of which are stable  - UA negative   - Lactic negative on 9/10  - Cultures pending  - Fever curve improved  - CT scan shows possible pneumonia; surgery site is stable  - covidmarissa 9/13 negative  - CXR shows no acute cardiopulmonary disease

## 2023-09-12 NOTE — PLAN OF CARE
Problem: MOBILITY - ADULT  Goal: Maintain or return to baseline ADL function  Description: INTERVENTIONS:  -  Assess patient's ability to carry out ADLs; assess patient's baseline for ADL function and identify physical deficits which impact ability to perform ADLs (bathing, care of mouth/teeth, toileting, grooming, dressing, etc.)  - Assess/evaluate cause of self-care deficits   - Assess range of motion  - Assess patient's mobility; develop plan if impaired  - Assess patient's need for assistive devices and provide as appropriate  - Encourage maximum independence but intervene and supervise when necessary  - Involve family in performance of ADLs  - Assess for home care needs following discharge   - Consider OT consult to assist with ADL evaluation and planning for discharge  - Provide patient education as appropriate  Outcome: Progressing  Goal: Maintains/Returns to pre admission functional level  Description: INTERVENTIONS:  - Perform BMAT or MOVE assessment daily.   - Set and communicate daily mobility goal to care team and patient/family/caregiver. - Collaborate with rehabilitation services on mobility goals if consulted  - Perform Range of Motion  times a day. - Reposition patient every  hours.   - Dangle patient times a day  - Stand patient  times a day  - Ambulate patient  times a day  - Out of bed to chair  times a day   - Out of bed for meals  times a day  - Out of bed for toileting  - Record patient progress and toleration of activity level   Outcome: Progressing     Problem: PAIN - ADULT  Goal: Verbalizes/displays adequate comfort level or baseline comfort level  Description: Interventions:  - Encourage patient to monitor pain and request assistance  - Assess pain using appropriate pain scale  - Administer analgesics based on type and severity of pain and evaluate response  - Implement non-pharmacological measures as appropriate and evaluate response  - Consider cultural and social influences on pain and pain management  - Notify physician/advanced practitioner if interventions unsuccessful or patient reports new pain  Outcome: Progressing     Problem: SAFETY ADULT  Goal: Maintain or return to baseline ADL function  Description: INTERVENTIONS:  -  Assess patient's ability to carry out ADLs; assess patient's baseline for ADL function and identify physical deficits which impact ability to perform ADLs (bathing, care of mouth/teeth, toileting, grooming, dressing, etc.)  - Assess/evaluate cause of self-care deficits   - Assess range of motion  - Assess patient's mobility; develop plan if impaired  - Assess patient's need for assistive devices and provide as appropriate  - Encourage maximum independence but intervene and supervise when necessary  - Involve family in performance of ADLs  - Assess for home care needs following discharge   - Consider OT consult to assist with ADL evaluation and planning for discharge  - Provide patient education as appropriate  Outcome: Progressing  Goal: Maintains/Returns to pre admission functional level  Description: INTERVENTIONS:  - Perform BMAT or MOVE assessment daily.   - Set and communicate daily mobility goal to care team and patient/family/caregiver. - Collaborate with rehabilitation services on mobility goals if consulted  - Perform Range of Motion  times a day. - Reposition patient every hours.   - Dangle patient times a day  - Stand patient times a day  - Ambulate patient  times a day  - Out of bed to chair  times a day   - Out of bed for meals  times a day  - Out of bed for toileting  - Record patient progress and toleration of activity level   Outcome: Progressing  Goal: Patient will remain free of falls  Description: INTERVENTIONS:  - Educate patient/family on patient safety including physical limitations  - Instruct patient to call for assistance with activity   - Consult OT/PT to assist with strengthening/mobility   - Keep Call bell within reach  - Keep bed low and locked with side rails adjusted as appropriate  - Keep care items and personal belongings within reach  - Initiate and maintain comfort rounds  - Make Fall Risk Sign visible to staff  - Offer Toileting every  Hours, in advance of need  - Initiate/Maintain alarm  - Obtain necessary fall risk management equipment:   - Apply yellow socks and bracelet for high fall risk patients  - Consider moving patient to room near nurses station  Outcome: Progressing     Problem: MUSCULOSKELETAL - ADULT  Goal: Maintain or return mobility to safest level of function  Description: INTERVENTIONS:  - Assess patient's ability to carry out ADLs; assess patient's baseline for ADL function and identify physical deficits which impact ability to perform ADLs (bathing, care of mouth/teeth, toileting, grooming, dressing, etc.)  - Assess/evaluate cause of self-care deficits   - Assess range of motion  - Assess patient's mobility  - Assess patient's need for assistive devices and provide as appropriate  - Encourage maximum independence but intervene and supervise when necessary  - Involve family in performance of ADLs  - Assess for home care needs following discharge   - Consider OT consult to assist with ADL evaluation and planning for discharge  - Provide patient education as appropriate  Outcome: Progressing  Goal: Maintain proper alignment of affected body part  Description: INTERVENTIONS:  - Support, maintain and protect limb and body alignment  - Provide patient/ family with appropriate education  Outcome: Progressing

## 2023-09-12 NOTE — ASSESSMENT & PLAN NOTE
- Psychiatry consulted, appreciate input  - Started on clonidine at night  - Will need to establish care as an outpatient

## 2023-09-12 NOTE — ASSESSMENT & PLAN NOTE
- Leukocytosis up to 16 -- 17 -- 20  - All wounds addressed on 9/11 of which are stable  - UA negative   - Lactic negative on 9/10  - Cultures pending  - Fever curve improved  - CT scan shows possible pneumonia; surgery site is stable  - Will Covid/flu/rsv; repeat CXR; am Pro-melvin
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
## 2023-09-12 NOTE — OCCUPATIONAL THERAPY NOTE
Occupational Therapy Treatment Note:      09/12/23 1110   OT Last Visit   OT Visit Date 09/12/23   Note Type   Note Type Treatment   Pain Assessment   Pain Assessment Tool FLACC   Pain Rating: FLACC (Rest) - Face 0   Pain Rating: FLACC (Rest) - Legs 0   Pain Rating: FLACC (Rest) - Activity 0   Pain Rating: FLACC (Rest) - Cry 0   Pain Rating: FLACC (Rest) - Consolability 0   Score: FLACC (Rest) 0   Pain Rating: FLACC (Activity) - Face 2   Pain Rating: FLACC (Activity) - Legs 1   Pain Rating: FLACC (Activity) - Activity 1   Pain Rating: FLACC (Activity) - Cry 1   Pain Rating: FLACC (Activity) - Consolability 0   Score: FLACC (Activity) 5   Restrictions/Precautions   RUE Weight Bearing Per Order NWB   Braces or Orthoses Sling   ADL   UB Dressing Assistance 2  Maximal Assistance   UB Dressing Deficit   (sling management and donning, pt noted to move r ue/ shoulder and not wear sling in bed)   LB Dressing Assistance 2  Maximal Assistance   Bed Mobility   Supine to Sit 3  Moderate assistance   Sit to Supine 3  Moderate assistance   Transfers   Sit to Stand 3  Moderate assistance   Stand to Sit 3  Moderate assistance   Functional Mobility   Functional Mobility 3  Moderate assistance   Additional items   (single crutch)   Cognition   Overall Cognitive Status WFL   Activity Tolerance   Activity Tolerance Patient tolerated treatment well   Assessment   Assessment pt participated in am ot session and ws seen focusing on activity tolernece, bed mobility sit to from stand and functional mobility using one crutch. pt without reports of dizzines pt was able to walk distance to bathroom with much time. pt required mod asst x 1 for in and out of bed and for functional transfers and mobility pt required incrased time for walking secondary to pain and stiffness. pt with drain l thigh. pt with poor compliance with nwbing and sline wearing r ue. noted to lift arms about head in stretching motion. vc's provided.  pt is pleasant and cooperative this session. will follow for furhter adl tasks. Plan   Treatment Interventions ADL retraining;Functional transfer training; Endurance training;Patient/family training; Activityengagement   Goal Expiration Date 09/21/23   OT Treatment Day 1   OT Frequency 2-3x/wk   Recommendation   OT Discharge Recommendation No rehabilitation needs   AM-PAC Daily Activity Inpatient   Lower Body Dressing 2   Bathing 3   Toileting 3   Upper Body Dressing 3   Grooming 3   Eating 4   Daily Activity Raw Score 18   Daily Activity Standardized Score (Calc for Raw Score >=11) 38.66   AM-PAC Applied Cognition Inpatient   Following a Speech/Presentation 4   Understanding Ordinary Conversation 4   Taking Medications 4   Remembering Where Things Are Placed or Put Away 4   Remembering List of 4-5 Errands 3   Taking Care of Complicated Tasks 3   Applied Cognition Raw Score 22   Applied Cognition Standardized Score 47.83     April A Storm
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
## 2023-09-12 NOTE — PLAN OF CARE
Problem: PHYSICAL THERAPY ADULT  Goal: Performs mobility at highest level of function for planned discharge setting. See evaluation for individualized goals. Description: Treatment/Interventions: ADL retraining, Functional transfer training, LE strengthening/ROM, Elevations, Therapeutic exercise, Endurance training, Patient/family training, Equipment eval/education, Bed mobility, Gait training, Compensatory technique education, Spoke to nursing, Spoke to case management, Spoke to advanced practitioner, OT  Equipment Recommended:  (TBD w/ progress)       See flowsheet documentation for full assessment, interventions and recommendations. Outcome: Progressing  Note: Prognosis: Fair  Problem List: Decreased strength, Decreased range of motion, Decreased endurance, Impaired balance, Decreased mobility, Decreased safety awareness, Orthopedic restrictions, Pain  Assessment: Pt demonstrated improved endurance compared to previous session, with him performing all transfers with Ax1, then ambulating short distances within bedroom towards stretcher. He Remains limited overall comapred to baseline by acute injuries, resultant in LLE pain & swelling that affects strength & L knee AROM. anticipate this will improve steadily with repetition & daily mobility as tolerated, working towards returning to independence at this time & long term return to PLOF. Pt will need to increase ambulatory distances with decreased assist and negotiate 3-4 steps to enter home. Barriers to Discharge: Inaccessible home environment  Barriers to Discharge Comments: increased ambulation with decreased assist & 3-4 ADALGISA home  PT Discharge Recommendation: Home with outpatient rehabilitation    See flowsheet documentation for full assessment.

## 2023-09-13 LAB
ALBUMIN SERPL BCP-MCNC: 4 G/DL (ref 3.5–5)
ALP SERPL-CCNC: 47 U/L (ref 34–104)
ALT SERPL W P-5'-P-CCNC: 53 U/L (ref 7–52)
AMPHETAMINES SERPL QL SCN: NEGATIVE
ANION GAP SERPL CALCULATED.3IONS-SCNC: 10 MMOL/L
ANISOCYTOSIS BLD QL SMEAR: PRESENT
AST SERPL W P-5'-P-CCNC: 37 U/L (ref 13–39)
BARBITURATES UR QL: NEGATIVE
BASOPHILS # BLD MANUAL: 0 THOUSAND/UL (ref 0–0.1)
BASOPHILS NFR MAR MANUAL: 0 % (ref 0–1)
BENZODIAZ UR QL: NEGATIVE
BILIRUB SERPL-MCNC: 0.69 MG/DL (ref 0.2–1)
BUN SERPL-MCNC: 14 MG/DL (ref 5–25)
CALCIUM SERPL-MCNC: 9.1 MG/DL (ref 8.4–10.2)
CHLORIDE SERPL-SCNC: 100 MMOL/L (ref 96–108)
CO2 SERPL-SCNC: 27 MMOL/L (ref 21–32)
COCAINE UR QL: NEGATIVE
CREAT SERPL-MCNC: 0.78 MG/DL (ref 0.6–1.3)
EOSINOPHIL # BLD MANUAL: 0.3 THOUSAND/UL (ref 0–0.4)
EOSINOPHIL NFR BLD MANUAL: 2 % (ref 0–6)
ERYTHROCYTE [DISTWIDTH] IN BLOOD BY AUTOMATED COUNT: 14.6 % (ref 11.6–15.1)
GFR SERPL CREATININE-BSD FRML MDRD: 118 ML/MIN/1.73SQ M
GLUCOSE SERPL-MCNC: 119 MG/DL (ref 65–140)
HCT VFR BLD AUTO: 24.7 % (ref 36.5–49.3)
HGB BLD-MCNC: 7.9 G/DL (ref 12–17)
LYMPHOCYTES # BLD AUTO: 1.49 THOUSAND/UL (ref 0.6–4.47)
LYMPHOCYTES # BLD AUTO: 10 % (ref 14–44)
MACROCYTES BLD QL AUTO: PRESENT
MAGNESIUM SERPL-MCNC: 2.2 MG/DL (ref 1.9–2.7)
MCH RBC QN AUTO: 29.8 PG (ref 26.8–34.3)
MCHC RBC AUTO-ENTMCNC: 32 G/DL (ref 31.4–37.4)
MCV RBC AUTO: 93 FL (ref 82–98)
METAMYELOCYTES NFR BLD MANUAL: 2 % (ref 0–1)
METHADONE UR QL: NEGATIVE
MONOCYTES # BLD AUTO: 0.59 THOUSAND/UL (ref 0–1.22)
MONOCYTES NFR BLD: 4 % (ref 4–12)
NEUTROPHILS # BLD MANUAL: 12.18 THOUSAND/UL (ref 1.85–7.62)
NEUTS SEG NFR BLD AUTO: 82 % (ref 43–75)
NRBC BLD AUTO-RTO: 3 /100 WBC (ref 0–2)
OPIATES UR QL SCN: NEGATIVE
OXYCODONE+OXYMORPHONE UR QL SCN: POSITIVE
PCP UR QL: NEGATIVE
PHOSPHATE SERPL-MCNC: 2.9 MG/DL (ref 2.7–4.5)
PLATELET # BLD AUTO: 517 THOUSANDS/UL (ref 149–390)
PLATELET BLD QL SMEAR: ABNORMAL
PMV BLD AUTO: 9.1 FL (ref 8.9–12.7)
POIKILOCYTOSIS BLD QL SMEAR: PRESENT
POLYCHROMASIA BLD QL SMEAR: PRESENT
POTASSIUM SERPL-SCNC: 3.8 MMOL/L (ref 3.5–5.3)
PROCALCITONIN SERPL-MCNC: 0.21 NG/ML
PROT SERPL-MCNC: 7.1 G/DL (ref 6.4–8.4)
RBC # BLD AUTO: 2.65 MILLION/UL (ref 3.88–5.62)
RBC MORPH BLD: PRESENT
SODIUM SERPL-SCNC: 137 MMOL/L (ref 135–147)
THC UR QL: POSITIVE
WBC # BLD AUTO: 14.85 THOUSAND/UL (ref 4.31–10.16)

## 2023-09-13 PROCEDURE — 84100 ASSAY OF PHOSPHORUS: CPT | Performed by: PHYSICIAN ASSISTANT

## 2023-09-13 PROCEDURE — 85027 COMPLETE CBC AUTOMATED: CPT | Performed by: PHYSICIAN ASSISTANT

## 2023-09-13 PROCEDURE — 84145 PROCALCITONIN (PCT): CPT | Performed by: PHYSICIAN ASSISTANT

## 2023-09-13 PROCEDURE — 94664 DEMO&/EVAL PT USE INHALER: CPT

## 2023-09-13 PROCEDURE — 99232 SBSQ HOSP IP/OBS MODERATE 35: CPT | Performed by: STUDENT IN AN ORGANIZED HEALTH CARE EDUCATION/TRAINING PROGRAM

## 2023-09-13 PROCEDURE — 85007 BL SMEAR W/DIFF WBC COUNT: CPT | Performed by: PHYSICIAN ASSISTANT

## 2023-09-13 PROCEDURE — 99024 POSTOP FOLLOW-UP VISIT: CPT | Performed by: UROLOGY

## 2023-09-13 PROCEDURE — 83735 ASSAY OF MAGNESIUM: CPT | Performed by: PHYSICIAN ASSISTANT

## 2023-09-13 PROCEDURE — 99024 POSTOP FOLLOW-UP VISIT: CPT | Performed by: SURGERY

## 2023-09-13 PROCEDURE — 99232 SBSQ HOSP IP/OBS MODERATE 35: CPT | Performed by: PSYCHIATRY & NEUROLOGY

## 2023-09-13 PROCEDURE — 80053 COMPREHEN METABOLIC PANEL: CPT | Performed by: PHYSICIAN ASSISTANT

## 2023-09-13 PROCEDURE — 80307 DRUG TEST PRSMV CHEM ANLYZR: CPT | Performed by: STUDENT IN AN ORGANIZED HEALTH CARE EDUCATION/TRAINING PROGRAM

## 2023-09-13 PROCEDURE — 94668 MNPJ CHEST WALL SBSQ: CPT

## 2023-09-13 PROCEDURE — NC001 PR NO CHARGE: Performed by: STUDENT IN AN ORGANIZED HEALTH CARE EDUCATION/TRAINING PROGRAM

## 2023-09-13 RX ORDER — QUETIAPINE FUMARATE 25 MG/1
25 TABLET, FILM COATED ORAL DAILY
Status: DISCONTINUED | OUTPATIENT
Start: 2023-09-13 | End: 2023-09-15 | Stop reason: HOSPADM

## 2023-09-13 RX ORDER — QUETIAPINE FUMARATE 25 MG/1
25 TABLET, FILM COATED ORAL
Status: DISCONTINUED | OUTPATIENT
Start: 2023-09-13 | End: 2023-09-13

## 2023-09-13 RX ORDER — QUETIAPINE FUMARATE 25 MG/1
50 TABLET, FILM COATED ORAL
Status: DISCONTINUED | OUTPATIENT
Start: 2023-09-13 | End: 2023-09-15 | Stop reason: HOSPADM

## 2023-09-13 RX ORDER — FLUOXETINE 10 MG/1
10 CAPSULE ORAL DAILY
Status: DISCONTINUED | OUTPATIENT
Start: 2023-09-13 | End: 2023-09-15 | Stop reason: HOSPADM

## 2023-09-13 RX ORDER — OLANZAPINE 10 MG/1
5 INJECTION, POWDER, LYOPHILIZED, FOR SOLUTION INTRAMUSCULAR ONCE AS NEEDED
Status: DISCONTINUED | OUTPATIENT
Start: 2023-09-13 | End: 2023-09-15

## 2023-09-13 RX ADMIN — QUETIAPINE FUMARATE 50 MG: 25 TABLET ORAL at 22:27

## 2023-09-13 RX ADMIN — METHOCARBAMOL 500 MG: 500 TABLET ORAL at 05:36

## 2023-09-13 RX ADMIN — BACITRACIN ZINC 1 SMALL APPLICATION: 500 OINTMENT TOPICAL at 10:03

## 2023-09-13 RX ADMIN — METHOCARBAMOL 500 MG: 500 TABLET ORAL at 23:14

## 2023-09-13 RX ADMIN — OXYCODONE HYDROCHLORIDE 10 MG: 10 TABLET ORAL at 10:06

## 2023-09-13 RX ADMIN — OXYCODONE HYDROCHLORIDE 10 MG: 10 TABLET ORAL at 19:21

## 2023-09-13 RX ADMIN — PANTOPRAZOLE SODIUM 40 MG: 40 TABLET, DELAYED RELEASE ORAL at 05:36

## 2023-09-13 RX ADMIN — ACETAMINOPHEN 975 MG: 325 TABLET, FILM COATED ORAL at 05:36

## 2023-09-13 RX ADMIN — METHOCARBAMOL 500 MG: 500 TABLET ORAL at 19:22

## 2023-09-13 RX ADMIN — ENOXAPARIN SODIUM 30 MG: 30 INJECTION SUBCUTANEOUS at 10:02

## 2023-09-13 RX ADMIN — OXYCODONE HYDROCHLORIDE 10 MG: 10 TABLET ORAL at 05:36

## 2023-09-13 RX ADMIN — QUETIAPINE FUMARATE 25 MG: 25 TABLET ORAL at 11:37

## 2023-09-13 RX ADMIN — ENOXAPARIN SODIUM 30 MG: 30 INJECTION SUBCUTANEOUS at 22:41

## 2023-09-13 RX ADMIN — METHOCARBAMOL 500 MG: 500 TABLET ORAL at 11:27
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
## 2023-09-13 NOTE — PROGRESS NOTES
Pt continues to act out of baseline; still appears paranoid;  pt will ask for things then revokes the request; pt also appears to become aggressive; eyes bulging/nasal flaring/voice agitated but then is able to calm himself down; pt stating "do not put too much in the computer, they will read it"; pt demeanor changes within seconds-happy then upset; dr Keith Pablo aware

## 2023-09-13 NOTE — QUICK NOTE
Asked by nursing for evaluation of patient due to altered mental status. Patient has been afebrile vital signs stable. On room air. Drain with dark serosanguineous output, nontender bilateral thighs, incisions clean dry intact. No complaints of urinary dysuria, frequency, or urgency. Agree with nursing regarding altered mental status. Rapid mood changes from angry to friendly. Patient states he feels "great". Sitting with his head off the bed and one leg hanging off the bed. Tachypneic. Of note nursing does state patient had visitors today. Denies visual or auditory hallucinations.     Physical exam:  Notable for pupil dilation      Plan:  One-to-one observation  Drug screen  We will ask for security assistance and searching room
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
## 2023-09-13 NOTE — PLAN OF CARE
Problem: OCCUPATIONAL THERAPY ADULT  Goal: Performs self-care activities at highest level of function for planned discharge setting. See evaluation for individualized goals. Outcome: Progressing  Note: Limitation: Decreased ADL status, Decreased self-care trans, Decreased high-level ADLs, Decreased endurance     Assessment: pt participated in am ot session and ws seen focusing on activity tolernece, bed mobility sit to from stand and functional mobility using one crutch. pt without reports of dizzines pt was able to walk distance to bathroom with much time. pt required mod asst x 1 for in and out of bed and for functional transfers and mobility pt required incrased time for walking secondary to pain and stiffness. pt with drain l thigh. pt with poor compliance with nwbing and sline wearing r ue. noted to lift arms about head in stretching motion. vc's provided. pt is pleasant and cooperative this session. will follow for furhter adl tasks.      OT Discharge Recommendation: Home with home health rehabilitation        April A Storm
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
## 2023-09-13 NOTE — ASSESSMENT & PLAN NOTE
- Psychiatry consulted, appreciate input-9/12  -Per psych's recommendations he was started on clonidine, patient has had obscure behavior in the past 24 hours.   Clonidine was discontinued and patient was started on Seroquel  -Attempt to have patient reevaluated by psych  -Patient will require PCP and psych follow-up as an outpatient

## 2023-09-13 NOTE — PROGRESS NOTES
Notified by nursing staff "pt is acting differently than what he has been"; into assess pt, pt shaking, whispering, crying, diaphoretic; stating "they are tears of joaquin"; pt answers questions appropriately, however appears to be paranoid;  pupils dilated, equal and reactive bilaterally; facial symmetry wnl; no deviation; motor strength wnl; vital signs stable; dr Chiara Urena aware
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
## 2023-09-13 NOTE — PLAN OF CARE
Problem: SAFETY,RESTRAINT: NV/NON-SELF DESTRUCTIVE BEHAVIOR  Goal: Remains free of harm/injury (restraint for non violent/non self-detsructive behavior)  Description: INTERVENTIONS:  - Instruct patient/family regarding restraint use   - Assess and monitor physiologic and psychological status   - Provide interventions and comfort measures to meet assessed patient needs   - Identify and implement measures to help patient regain control  - Assess readiness for release of restraint   Outcome: Progressing     Problem: MOBILITY - ADULT  Goal: Maintain or return to baseline ADL function  Description: INTERVENTIONS:  -  Assess patient's ability to carry out ADLs; assess patient's baseline for ADL function and identify physical deficits which impact ability to perform ADLs (bathing, care of mouth/teeth, toileting, grooming, dressing, etc.)  - Assess/evaluate cause of self-care deficits   - Assess range of motion  - Assess patient's mobility; develop plan if impaired  - Assess patient's need for assistive devices and provide as appropriate  - Encourage maximum independence but intervene and supervise when necessary  - Involve family in performance of ADLs  - Assess for home care needs following discharge   - Consider OT consult to assist with ADL evaluation and planning for discharge  - Provide patient education as appropriate  Outcome: Progressing     Problem: INFECTION - ADULT  Goal: Absence of fever/infection during neutropenic period  Description: INTERVENTIONS:  - Monitor WBC    Outcome: Progressing     Problem: SAFETY ADULT  Goal: Patient will remain free of falls  Description: INTERVENTIONS:  - Educate patient/family on patient safety including physical limitations  - Instruct patient to call for assistance with activity   - Consult OT/PT to assist with strengthening/mobility   - Keep Call bell within reach  - Keep bed low and locked with side rails adjusted as appropriate  - Keep care items and personal belongings within reach  - Initiate and maintain comfort rounds    - Apply yellow socks and bracelet for high fall risk patients  - Consider moving patient to room near nurses station  Outcome: Progressing     Problem: MUSCULOSKELETAL - ADULT  Goal: Maintain or return mobility to safest level of function  Description: INTERVENTIONS:  - Assess patient's ability to carry out ADLs; assess patient's baseline for ADL function and identify physical deficits which impact ability to perform ADLs (bathing, care of mouth/teeth, toileting, grooming, dressing, etc.)  - Assess/evaluate cause of self-care deficits   - Assess range of motion  - Assess patient's mobility  - Assess patient's need for assistive devices and provide as appropriate  - Encourage maximum independence but intervene and supervise when necessary  - Involve family in performance of ADLs  - Assess for home care needs following discharge   - Consider OT consult to assist with ADL evaluation and planning for discharge  - Provide patient education as appropriate  Outcome: Progressing     Problem: MUSCULOSKELETAL - ADULT  Goal: Maintain proper alignment of affected body part  Description: INTERVENTIONS:  - Support, maintain and protect limb and body alignment  - Provide patient/ family with appropriate education  Outcome: Progressing

## 2023-09-13 NOTE — OCCUPATIONAL THERAPY NOTE
09/12/23 1110   OT Last Visit   OT Visit Date 09/12/23   Note Type   Note Type Treatment   Pain Assessment   Pain Assessment Tool FLACC   Pain Rating: FLACC (Rest) - Face 0   Pain Rating: FLACC (Rest) - Legs 0   Pain Rating: FLACC (Rest) - Activity 0   Pain Rating: FLACC (Rest) - Cry 0   Pain Rating: FLACC (Rest) - Consolability 0   Score: FLACC (Rest) 0   Pain Rating: FLACC (Activity) - Face 2   Pain Rating: FLACC (Activity) - Legs 1   Pain Rating: FLACC (Activity) - Activity 1   Pain Rating: FLACC (Activity) - Cry 1   Pain Rating: FLACC (Activity) - Consolability 0   Score: FLACC (Activity) 5   Restrictions/Precautions   RUE Weight Bearing Per Order NWB   Braces or Orthoses Sling   ADL   UB Dressing Assistance 2  Maximal Assistance   UB Dressing Deficit   (sling management and donning, pt noted to move r ue/ shoulder and not wear sling in bed)   LB Dressing Assistance 2  Maximal Assistance   Bed Mobility   Supine to Sit 3  Moderate assistance   Sit to Supine 3  Moderate assistance   Transfers   Sit to Stand 3  Moderate assistance   Stand to Sit 3  Moderate assistance   Functional Mobility   Functional Mobility 3  Moderate assistance   Additional items   (single crutch)   Cognition   Overall Cognitive Status WFL   Activity Tolerance   Activity Tolerance Patient tolerated treatment well   Assessment   Assessment pt participated in am ot session and ws seen focusing on activity tolernece, bed mobility sit to from stand and functional mobility using one crutch. pt without reports of dizzines pt was able to walk distance to bathroom with much time. pt required mod asst x 1 for in and out of bed and for functional transfers and mobility pt required incrased time for walking secondary to pain and stiffness. pt with drain l thigh. pt with poor compliance with nwbing and sline wearing r ue. noted to lift arms about head in stretching motion. vc's provided. pt is pleasant and cooperative this session.  will follow for burthter adl tasks. Plan   Treatment Interventions ADL retraining;Functional transfer training; Endurance training;Patient/family training; Activityengagement   Goal Expiration Date 09/21/23   OT Treatment Day 1   OT Frequency 2-3x/wk   Recommendation   OT Discharge Recommendation Home with home health rehabilitation   AM-PAC Daily Activity Inpatient   Lower Body Dressing 2   Bathing 3   Toileting 3   Upper Body Dressing 3   Grooming 3   Eating 4   Daily Activity Raw Score 18   Daily Activity Standardized Score (Calc for Raw Score >=11) 38.66   AM-PAC Applied Cognition Inpatient   Following a Speech/Presentation 4   Understanding Ordinary Conversation 4   Taking Medications 4   Remembering Where Things Are Placed or Put Away 4   Remembering List of 4-5 Errands 3   Taking Care of Complicated Tasks 3   Applied Cognition Raw Score 22   Applied Cognition Standardized Score 47.83     April A Storm
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
## 2023-09-13 NOTE — ASSESSMENT & PLAN NOTE
· Patient was noted to have strange behavior last night with rapidly changing mood. · UDS was sent that was positive for [facility]  · Patient fully oriented-GCS is 15  · Continue to monitor patient's mental status. · Possibly associated with starting clonidine for acute stress reaction. We will discontinue clonidine and start Seroquel given patient is paranoid this morning. · Patient currently refusing to talk to psych via iPad-we will continue to attempt reevaluation.

## 2023-09-13 NOTE — PLAN OF CARE
Problem: MOBILITY - ADULT  Goal: Maintain or return to baseline ADL function  Description: INTERVENTIONS:  -  Assess patient's ability to carry out ADLs; assess patient's baseline for ADL function and identify physical deficits which impact ability to perform ADLs (bathing, care of mouth/teeth, toileting, grooming, dressing, etc.)  - Assess/evaluate cause of self-care deficits   - Assess range of motion  - Assess patient's mobility; develop plan if impaired  - Assess patient's need for assistive devices and provide as appropriate  - Encourage maximum independence but intervene and supervise when necessary  - Involve family in performance of ADLs  - Assess for home care needs following discharge   - Consider OT consult to assist with ADL evaluation and planning for discharge  - Provide patient education as appropriate  Outcome: Progressing     Problem: PAIN - ADULT  Goal: Verbalizes/displays adequate comfort level or baseline comfort level  Description: Interventions:  - Encourage patient to monitor pain and request assistance  - Assess pain using appropriate pain scale  - Administer analgesics based on type and severity of pain and evaluate response  - Implement non-pharmacological measures as appropriate and evaluate response  - Consider cultural and social influences on pain and pain management  - Notify physician/advanced practitioner if interventions unsuccessful or patient reports new pain  Outcome: Progressing     Problem: INFECTION - ADULT  Goal: Absence or prevention of progression during hospitalization  Description: INTERVENTIONS:  - Assess and monitor for signs and symptoms of infection  - Monitor lab/diagnostic results  - Monitor all insertion sites, i.e. indwelling lines, tubes, and drains  - Monitor endotracheal if appropriate and nasal secretions for changes in amount and color  - [place] appropriate cooling/warming therapies per order  - Administer medications as ordered  - Instruct and encourage patient and family to use good hand hygiene technique  - Identify and instruct in appropriate isolation precautions for identified infection/condition  Outcome: Progressing
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
## 2023-09-14 LAB
ANION GAP SERPL CALCULATED.3IONS-SCNC: 7 MMOL/L
BUN SERPL-MCNC: 13 MG/DL (ref 5–25)
CALCIUM SERPL-MCNC: 8.9 MG/DL (ref 8.4–10.2)
CHLORIDE SERPL-SCNC: 102 MMOL/L (ref 96–108)
CO2 SERPL-SCNC: 29 MMOL/L (ref 21–32)
CREAT SERPL-MCNC: 0.8 MG/DL (ref 0.6–1.3)
ERYTHROCYTE [DISTWIDTH] IN BLOOD BY AUTOMATED COUNT: 15.5 % (ref 11.6–15.1)
GFR SERPL CREATININE-BSD FRML MDRD: 117 ML/MIN/1.73SQ M
GLUCOSE SERPL-MCNC: 129 MG/DL (ref 65–140)
HCT VFR BLD AUTO: 25.7 % (ref 36.5–49.3)
HGB BLD-MCNC: 8.2 G/DL (ref 12–17)
MCH RBC QN AUTO: 29.3 PG (ref 26.8–34.3)
MCHC RBC AUTO-ENTMCNC: 31.9 G/DL (ref 31.4–37.4)
MCV RBC AUTO: 92 FL (ref 82–98)
PLATELET # BLD AUTO: 579 THOUSANDS/UL (ref 149–390)
PMV BLD AUTO: 8.7 FL (ref 8.9–12.7)
POTASSIUM SERPL-SCNC: 4.1 MMOL/L (ref 3.5–5.3)
RBC # BLD AUTO: 2.8 MILLION/UL (ref 3.88–5.62)
SODIUM SERPL-SCNC: 138 MMOL/L (ref 135–147)
WBC # BLD AUTO: 12.65 THOUSAND/UL (ref 4.31–10.16)

## 2023-09-14 PROCEDURE — 97530 THERAPEUTIC ACTIVITIES: CPT

## 2023-09-14 PROCEDURE — 97112 NEUROMUSCULAR REEDUCATION: CPT

## 2023-09-14 PROCEDURE — 85027 COMPLETE CBC AUTOMATED: CPT | Performed by: NURSE PRACTITIONER

## 2023-09-14 PROCEDURE — 99232 SBSQ HOSP IP/OBS MODERATE 35: CPT | Performed by: STUDENT IN AN ORGANIZED HEALTH CARE EDUCATION/TRAINING PROGRAM

## 2023-09-14 PROCEDURE — 94668 MNPJ CHEST WALL SBSQ: CPT

## 2023-09-14 PROCEDURE — 97535 SELF CARE MNGMENT TRAINING: CPT

## 2023-09-14 PROCEDURE — 80048 BASIC METABOLIC PNL TOTAL CA: CPT | Performed by: NURSE PRACTITIONER

## 2023-09-14 PROCEDURE — 97116 GAIT TRAINING THERAPY: CPT

## 2023-09-14 RX ORDER — LANOLIN ALCOHOL/MO/W.PET/CERES
3 CREAM (GRAM) TOPICAL
Status: DISCONTINUED | OUTPATIENT
Start: 2023-09-14 | End: 2023-09-15 | Stop reason: HOSPADM

## 2023-09-14 RX ADMIN — ENOXAPARIN SODIUM 30 MG: 30 INJECTION SUBCUTANEOUS at 10:17

## 2023-09-14 RX ADMIN — OXYCODONE HYDROCHLORIDE 10 MG: 10 TABLET ORAL at 00:31

## 2023-09-14 RX ADMIN — ACETAMINOPHEN 975 MG: 325 TABLET, FILM COATED ORAL at 05:24

## 2023-09-14 RX ADMIN — METHOCARBAMOL 500 MG: 500 TABLET ORAL at 23:52

## 2023-09-14 RX ADMIN — QUETIAPINE FUMARATE 25 MG: 25 TABLET ORAL at 10:17

## 2023-09-14 RX ADMIN — PANTOPRAZOLE SODIUM 40 MG: 40 TABLET, DELAYED RELEASE ORAL at 05:24

## 2023-09-14 RX ADMIN — BACITRACIN ZINC 1 SMALL APPLICATION: 500 OINTMENT TOPICAL at 10:17

## 2023-09-14 RX ADMIN — QUETIAPINE FUMARATE 50 MG: 25 TABLET ORAL at 21:56

## 2023-09-14 RX ADMIN — ACETAMINOPHEN 975 MG: 325 TABLET, FILM COATED ORAL at 21:56

## 2023-09-14 RX ADMIN — OXYCODONE HYDROCHLORIDE 10 MG: 10 TABLET ORAL at 21:56

## 2023-09-14 RX ADMIN — ACETAMINOPHEN 975 MG: 325 TABLET, FILM COATED ORAL at 13:11

## 2023-09-14 RX ADMIN — ENOXAPARIN SODIUM 30 MG: 30 INJECTION SUBCUTANEOUS at 21:56

## 2023-09-14 RX ADMIN — METHOCARBAMOL 500 MG: 500 TABLET ORAL at 05:24

## 2023-09-14 RX ADMIN — Medication 3 MG: at 00:31

## 2023-09-14 RX ADMIN — METHOCARBAMOL 500 MG: 500 TABLET ORAL at 13:11

## 2023-09-14 RX ADMIN — OXYCODONE HYDROCHLORIDE 10 MG: 10 TABLET ORAL at 10:18

## 2023-09-14 RX ADMIN — Medication 3 MG: at 22:07
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
## 2023-09-14 NOTE — PLAN OF CARE
Problem: PHYSICAL THERAPY ADULT  Goal: Performs mobility at highest level of function for planned discharge setting. See evaluation for individualized goals. Description: Treatment/Interventions: ADL retraining, Functional transfer training, LE strengthening/ROM, Elevations, Therapeutic exercise, Endurance training, Patient/family training, Equipment eval/education, Bed mobility, Gait training, Compensatory technique education, Spoke to nursing, Spoke to case management, Spoke to advanced practitioner, OT  Equipment Recommended:  (TBD w/ progress)       See flowsheet documentation for full assessment, interventions and recommendations. 9/14/2023 1520 by Melinda Finney PTA  Outcome: Progressing  Note: Prognosis: Fair  Problem List: Decreased strength, Decreased range of motion, Decreased endurance, Impaired balance, Decreased mobility, Decreased safety awareness, Orthopedic restrictions, Pain  Assessment: Pt demosntrated improved functional endurance this PM, performing all trasfers with decreased assist, then ambualting up to hosuehold distances with decreased assist & no recurrence of symptoms from AM session. He demosntrated improved pace & stride length by conclusion of session, but continues to require A for safety & balance during inital trial due to pain & weakness. pt was observed to be holding breath due to pain during initial steps, and demonstrated improved respirations this PM.  Upon return to room, pt required excessive time to return to sitting, but did so without extensive assist, even to control LLE as he descended. Discussion held with pt regarding role of PT, plan to trial steps & increase independence with mobility tasks & progress to home d/c in upcoming sessions. Pt encouraged to remain in recliner as long as tolerated & mobilize with staff assist for safety until it can be assumed syncopal symptoms will not recur with these tasks.   Pt voiced frustrations with focus on events of AM, but was educated about the risks associated with overaggressive activity & ignoring medical issues that could lead to further injury. Pt remains appropraite for PT at this time & d/c to mother's home with home PT follow up remains appropriate as long as pt continues to make anticipated progress without recurrence of syncope. Barriers to Discharge: Inaccessible home environment  Barriers to Discharge Comments: improved ambulation with less assist, successful stair trial  PT Discharge Recommendation: Home with home health rehabilitation (progress to OPPT when medically appropriate)    See flowsheet documentation for full assessment. 9/14/2023 1458 by Lindsay Kay PTA  Outcome: Progressing  Note: Prognosis: Fair  Problem List: Decreased strength, Decreased range of motion, Decreased endurance, Impaired balance, Decreased mobility, Decreased safety awareness, Orthopedic restrictions, Pain  Assessment: Pt demonstrated improved functional endurance this session, initially performing transfers & maintaining balance with unilateral support with overall less assist throughout except for bed mobiilty, where he required assist for LLE management while he attempted to pivot to EOB with LUE only. Discussed his moblity status at this time & recommended use of recliner at d/c if available to reduce need for assist upon d/c to mother's home. Pt then ambulated with gait deviations as noted above, primarily due to LLE pain, weakness & limited knee AROM. He did not demonstrate any LOB during initial walk out of room, but performed tasks slowly due to the same. Once pt demonstrated signs/symptoms of syncope, he quickly required increased assist to maintain safety initially because he suddenly picked up the pace spontaneously with LOB while pivoting, which got worse until pt stopped walking at firm instructions given by this PTA. Pt was less responsive at this time & profusely sweating.   Pt held up in standing with max A while recliner was brought up behind him to sit safely where he eventually recovered. Attempted to get BP ASAP, but vitals had stabilized prior to return to room. Pt not wearing Robin monitoring during session as it was d/c'd during events in previous nights. Extensive time taken for education regarding events of session, importance of daily mobiilty, upright sitting, and energy conservation to reduce likelihood of repeat events in upcoming trials. PT will continue to follow and treat as appropriate to safely progerss pt to higher level of mobiilty where he can d/c home with acceptable level of family assist & reduce short term risk for falls while pt recovers from acute injuries. PT POC & d/c plan remain appropriate at this time. Barriers to Discharge: Inaccessible home environment  Barriers to Discharge Comments: improved ambulation without need for assist, stair trial to enter mother's home  PT Discharge Recommendation: Home with home health rehabilitation (progress to OPPT when medically appropriate)    See flowsheet documentation for full assessment.

## 2023-09-14 NOTE — ASSESSMENT & PLAN NOTE
- Psychiatry consulted, appreciate input-9/12  - Per psych's recommendations he was started on clonidine, patient has had obscure behavior in the past 24 hours. Clonidine was discontinued and patient was started on Seroquel  - 9/13 patient was reevaluated by psych, who agreed with changing clonidine to Seroquel. They also recommended Prozac.   - Patient will require outpatient follow-up with psychiatry

## 2023-09-14 NOTE — PHYSICAL THERAPY NOTE
Physical Therapy Progress Note     09/14/23 1146   PT Last Visit   PT Visit Date 09/14/23   Note Type   Note Type Treatment   Pain Assessment   Pain Assessment Tool FLACC   Pain Rating: FLACC (Rest) - Face 1   Pain Rating: FLACC (Rest) - Legs 0   Pain Rating: FLACC (Rest) - Activity 0   Pain Rating: FLACC (Rest) - Cry 1   Pain Rating: FLACC (Rest) - Consolability 0   Score: FLACC (Rest) 2   Pain Rating: FLACC (Activity) - Face 2   Pain Rating: FLACC (Activity) - Legs 1   Pain Rating: FLACC (Activity) - Activity 1   Pain Rating: FLACC (Activity) - Cry 1   Pain Rating: FLACC (Activity) - Consolability 1   Score: FLACC (Activity) 6   Restrictions/Precautions   RUE Weight Bearing Per Order NWB   Braces or Orthoses Sling  (not present in room)   Other Precautions WBS; Fall Risk;Pain;Multiple lines  (THA drain L thigh)   Subjective   Subjective (S)  Pt encountered supine in bed, pleasant and motivated to participate in PT. Reports controlled pain at rest, elevated with activity. Is impulsive at times & requires instructions for safety in these moments. Reported sudden increase in diaphoresis & feeling flushed during gait, then became less responsive until returned to recliner with LEs elevated. Pt remained motivated to attempt gait again despite staff instructions to rest at this time for safety. Trauma attending physician arrived at this time & convinced pt to relax to recover & that mobiilty would be attempted again later in PM.  Pt in agreement to the same. Bed Mobility   Supine to Sit 3  Moderate assistance   Additional items Assist x 1   Transfers   Sit to Stand 4  Minimal assistance   Additional items Assist x 1   Stand to Sit 4  Minimal assistance   Additional items Assist x 1  (max A x1 in hallway durinc syncopal episode)   Ambulation/Elevation   Gait pattern Step to;Short stride; Foward flexed; Inconsistent kris;Decreased L stance;Decreased foot clearance;Narrow LYNNE; Improper Weight shift; Poor UE support Gait Assistance 3  Moderate assist   Additional items Assist x 1   Assistive Device Axillary crutches  (LUE)   Distance 30' at which point pt reported symptoms noted above, then 13' prior to seated rest to recover   Balance   Static Sitting Fair   Static Standing Fair -   Ambulatory Poor   Activity Tolerance   Activity Tolerance Patient limited by fatigue;Patient limited by pain;Treatment limited secondary to medical complications (Comment)   Nurse Daphne Orantes RN   Assessment   Prognosis Fair   Problem List Decreased strength;Decreased range of motion;Decreased endurance; Impaired balance;Decreased mobility; Decreased safety awareness;Orthopedic restrictions;Pain   Assessment Pt demonstrated improved functional endurance this session, initially performing transfers & maintaining balance with unilateral support with overall less assist throughout except for bed mobiilty, where he required assist for LLE management while he attempted to pivot to EOB with LUE only. Discussed his moblity status at this time & recommended use of recliner at d/c if available to reduce need for assist upon d/c to mother's home. Pt then ambulated with gait deviations as noted above, primarily due to LLE pain, weakness & limited knee AROM. He did not demonstrate any LOB during initial walk out of room, but performed tasks slowly due to the same. Once pt demonstrated signs/symptoms of syncope, he quickly required increased assist to maintain safety initially because he suddenly picked up the pace spontaneously with LOB while pivoting, which got worse until pt stopped walking at firm instructions given by this PTA. Pt was less responsive at this time & profusely sweating. Pt held up in standing with max A while recliner was brought up behind him to sit safely where he eventually recovered. Attempted to get BP ASAP, but vitals had stabilized prior to return to room.   Pt not wearing Robin monitoring during session as it was d/c'd during events in previous nights. Extensive time taken for education regarding events of session, importance of daily mobiilty, upright sitting, and energy conservation to reduce likelihood of repeat events in upcoming trials. PT will continue to follow and treat as appropriate to safely progerss pt to higher level of mobiilty where he can d/c home with acceptable level of family assist & reduce short term risk for falls while pt recovers from acute injuries. PT POC & d/c plan remain appropriate at this time. Barriers to Discharge Inaccessible home environment   Barriers to Discharge Comments improved ambulation without need for assist, stair trial to enter mother's home   Goals   Patient Goals to be able to walk again today   STG Expiration Date 09/21/23   PT Treatment Day 3   Plan   Treatment/Interventions Functional transfer training;LE strengthening/ROM; Elevations; Therapeutic exercise; Endurance training;Patient/family training;Equipment eval/education; Bed mobility;Gait training   Progress Progressing toward goals   PT Frequency 3-5x/wk   Recommendation   PT Discharge Recommendation Home with home health rehabilitation  (progress to OPPT when medically appropriate)   Equipment Recommended Crutches  (LUE)   AM-PAC Basic Mobility Inpatient   Turning in Flat Bed Without Bedrails 4   Lying on Back to Sitting on Edge of Flat Bed Without Bedrails 2   Moving Bed to Chair 3   Standing Up From Chair Using Arms 3   Walk in Room 2   Climb 3-5 Stairs With Railing 1   Basic Mobility Inpatient Raw Score 15   Basic Mobility Standardized Score 36.97   Highest Level Of Mobility   JH-HLM Goal 4: Move to chair/commode   JH-HLM Achieved 7: Walk 25 feet or more       Jhonny Wilhelm PTA    An [facility] Basic Mobility Raw Score less than 17 suggests pt would benefit from post acute rehab. Please also refer to the recommendation of the Physical Therapist for safe discharge planning.

## 2023-09-14 NOTE — OCCUPATIONAL THERAPY NOTE
Occupational Therapy Treatment Note:      09/14/23 1200   OT Last Visit   OT Visit Date 09/14/23   Note Type   Note Type Treatment   Restrictions/Precautions   RUE Weight Bearing Per Order NWB  (requires vc's to maintain limited use and nwbing)   ADL   Where Assessed Chair   Grooming Assistance 5  Supervision/Setup   UB Bathing Assistance 5  Supervision/Setup   LB Bathing Assistance 4  Minimal Assistance   UB Dressing Assistance 5  Supervision/Setup   LB Dressing Assistance 3  Moderate Assistance   Toileting Assistance  3  Moderate Assistance   Functional Standing Tolerance   Time fair balance c single crutch   Bed Mobility   Supine to Sit 3  Moderate assistance   Transfers   Sit to Stand 4  Minimal assistance   Stand to Sit 4  Minimal assistance   Functional Mobility   Functional Mobility 4  Minimal assistance   Additional Comments pt requries management at times of l le, vc's also required. pt with 1 espisode of  dizziness and lbp requiring hands on asst x 2 for stand to sit into chair. Cognition   Arousal/Participation Alert   Comments pt requires mod vc's for nwbing and limited use l ue. pt with poor ability to follow same, needs instruction every session. pt required mod vc's for techniques for supine to sit as well secondary to inc pain l le c rom   Activity Tolerance   Activity Tolerance Other (Comment)  (pt with reports of feeling hot / sweaty during functional mobility, needing max a x 2 to remain alert and maintain eyes open. pt returned to room.)   Assessment   Assessment pt participated in am ot session and was seen focusing on adls functional mobility and transfers using single crutch. pt with episode of faintness / sweating and feeling "hot" while walking. pt required asst x  2 to safely sit in chair. pt  was found on bed pan and was able to wipe self numerous times. pt then took sponge bath sitting on chair requireing asst for back and below kness and feet. pt is motivated.  difficulty with l knee flexion and has pain / swelling. pt with augusta drain inplace mid thigh. pt was educated on technique for supine to sit if needed using bed sheet. pt continues to require some asst for same. pts supportive male visitor was present this session. will follow focusing on goals from ie. Plan   Treatment Interventions ADL retraining;Functional transfer training; Endurance training;Patient/family training;Equipment evaluation/education; Activityengagement   Goal Expiration Date 09/21/23   OT Treatment Day 2   OT Frequency 2-3x/wk   Recommendation   OT Discharge Recommendation Home with home health rehabilitation  (home vs rehab pending progress.  pt does not want his   mother to have to assist him upon d/c. if pt does not progess then recommend in pt rehab)   AM-PAC Daily Activity Inpatient   Lower Body Dressing 3   Bathing 3   Toileting 3   Upper Body Dressing 3   Grooming 3   Eating 4   Daily Activity Raw Score 19   Daily Activity Standardized Score (Calc for Raw Score >=11) 40.22   AM-PAC Applied Cognition Inpatient   Following a Speech/Presentation 4   Understanding Ordinary Conversation 4   Taking Medications 3   Remembering Where Things Are Placed or Put Away 4   Remembering List of 4-5 Errands 3   Taking Care of Complicated Tasks 3   Applied Cognition Raw Score 21   Applied Cognition Standardized Score 44.3

## 2023-09-14 NOTE — PLAN OF CARE
Problem: OCCUPATIONAL THERAPY ADULT  Goal: Performs self-care activities at highest level of function for planned discharge setting. See evaluation for individualized goals. Outcome: Progressing  Note: Limitation: Decreased ADL status, Decreased self-care trans, Decreased high-level ADLs, Decreased endurance     Assessment: pt participated in am ot session and was seen focusing on adls functional mobility and transfers using single crutch. pt with episode of faintness / sweating and feeling "hot" while walking. pt required asst x  2 to safely sit in chair. pt  was found on bed pan and was able to wipe self numerous times. pt then took sponge bath sitting on chair requireing asst for back and below kness and feet. pt is motivated. difficulty with l knee flexion and has pain / swelling. pt with augusta drain inplace mid thigh. pt was educated on technique for supine to sit if needed using bed sheet. pt continues to require some asst for same. pts supportive male visitor was present this session. will follow focusing on goals from ie. OT Discharge Recommendation: Home with home health rehabilitation (home vs rehab pending progress.  pt does not want his   mother to have to assist him upon d/c. if pt does not progess then recommend in pt rehab)        April A Storm

## 2023-09-14 NOTE — PLAN OF CARE
Problem: PHYSICAL THERAPY ADULT  Goal: Performs mobility at highest level of function for planned discharge setting. See evaluation for individualized goals. Description: Treatment/Interventions: ADL retraining, Functional transfer training, LE strengthening/ROM, Elevations, Therapeutic exercise, Endurance training, Patient/family training, Equipment eval/education, Bed mobility, Gait training, Compensatory technique education, Spoke to nursing, Spoke to case management, Spoke to advanced practitioner, OT  Equipment Recommended:  (TBD w/ progress)       See flowsheet documentation for full assessment, interventions and recommendations. Outcome: Progressing  Note: Prognosis: Fair  Problem List: Decreased strength, Decreased range of motion, Decreased endurance, Impaired balance, Decreased mobility, Decreased safety awareness, Orthopedic restrictions, Pain  Assessment: Pt demonstrated improved functional endurance this session, initially performing transfers & maintaining balance with unilateral support with overall less assist throughout except for bed mobiilty, where he required assist for LLE management while he attempted to pivot to EOB with LUE only. Discussed his moblity status at this time & recommended use of recliner at d/c if available to reduce need for assist upon d/c to mother's home. Pt then ambulated with gait deviations as noted above, primarily due to LLE pain, weakness & limited knee AROM. He did not demonstrate any LOB during initial walk out of room, but performed tasks slowly due to the same. Once pt demonstrated signs/symptoms of syncope, he quickly required increased assist to maintain safety initially because he suddenly picked up the pace spontaneously with LOB while pivoting, which got worse until pt stopped walking at firm instructions given by this PTA. Pt was less responsive at this time & profusely sweating.   Pt held up in standing with max A while recliner was brought up behind him to sit safely where he eventually recovered. Attempted to get BP ASAP, but vitals had stabilized prior to return to room. Pt not wearing Robin monitoring during session as it was d/c'd during events in previous nights. Extensive time taken for education regarding events of session, importance of daily mobiilty, upright sitting, and energy conservation to reduce likelihood of repeat events in upcoming trials. PT will continue to follow and treat as appropriate to safely progerss pt to higher level of mobiilty where he can d/c home with acceptable level of family assist & reduce short term risk for falls while pt recovers from acute injuries. PT POC & d/c plan remain appropriate at this time. Barriers to Discharge: Inaccessible home environment  Barriers to Discharge Comments: improved ambulation without need for assist, stair trial to enter mother's home  PT Discharge Recommendation: Home with home health rehabilitation (progress to OPPT when medically appropriate)    See flowsheet documentation for full assessment.

## 2023-09-14 NOTE — PHYSICAL THERAPY NOTE
Physical Therapy Progress Note     09/14/23 1435   PT Last Visit   PT Visit Date 09/14/23   Note Type   Note Type BID visit/treatment   Pain Assessment   Pain Assessment Tool FLACC   Pain Rating: FLACC (Rest) - Face 1   Pain Rating: FLACC (Rest) - Legs 0   Pain Rating: FLACC (Rest) - Activity 0   Pain Rating: FLACC (Rest) - Cry 1   Pain Rating: FLACC (Rest) - Consolability 0   Score: FLACC (Rest) 2   Pain Rating: FLACC (Activity) - Face 2   Pain Rating: FLACC (Activity) - Legs 1   Pain Rating: FLACC (Activity) - Activity 1   Pain Rating: FLACC (Activity) - Cry 1   Pain Rating: FLACC (Activity) - Consolability 0   Score: FLACC (Activity) 5   Restrictions/Precautions   RUE Weight Bearing Per Order NWB   Braces or Orthoses Sling  (not present in room)   Other Precautions WBS;Pain; Fall Risk;Multiple lines  (THA drain L thigh)   Subjective   Subjective (S)  Pt encountered seated in recliner, pleasant and motivated for agreed upon PM session per discussion with attending at conclusion of AM session. He reports feeling fatigued overal, but no different than this AM at rest prior to mobiilty. He denies change in status with mobiilty when prompted frequently during session. pt became frustrated with referral to events of AM session, and feels it is better to focus on how things are better this PM as opposed to how things went in AM.  This PTA agreed with pt's outlook, but also informed him of risks recurring symptoms at this time given recent history not only this AM, but in prior session earlier this week. Transfers   Sit to Stand 4  Minimal assistance   Additional items Assist x 1; Armrests; Increased time required   Stand to Sit 4  Minimal assistance   Additional items Assist x 1; Armrests; Increased time required   Ambulation/Elevation   Gait pattern Step to;Short stride; Foward flexed; Inconsistent kris; Shuffling;Decreased L stance;Decreased foot clearance;Narrow LYNNE; Improper Weight shift   Gait Assistance 3  Moderate assist  (progressing to min A by conclusion of session)   Additional items Assist x 1  (+ chair follow)   Assistive Device Axillary crutches  (LUE)   Distance 50' x 2   Balance   Static Sitting Fair   Static Standing Fair -   Ambulatory Poor   Activity Tolerance   Activity Tolerance Patient tolerated treatment well;Patient limited by fatigue;Patient limited by pain   Nurse [address], RN   Assessment   Prognosis Fair   Problem List Decreased strength;Decreased range of motion;Decreased endurance; Impaired balance;Decreased mobility; Decreased safety awareness;Orthopedic restrictions;Pain   Assessment Pt demosntrated improved functional endurance this PM, performing all trasfers with decreased assist, then ambualting up to hosuehold distances with decreased assist & no recurrence of symptoms from AM session. He demosntrated improved pace & stride length by conclusion of session, but continues to require A for safety & balance during inital trial due to pain & weakness. pt was observed to be holding breath due to pain during initial steps, and demonstrated improved respirations this PM.  Upon return to room, pt required excessive time to return to sitting, but did so without extensive assist, even to control LLE as he descended. Discussion held with pt regarding role of PT, plan to trial steps & increase independence with mobility tasks & progress to home d/c in upcoming sessions. Pt encouraged to remain in recliner as long as tolerated & mobilize with staff assist for safety until it can be assumed syncopal symptoms will not recur with these tasks. Pt voiced frustrations with focus on events of AM, but was educated about the risks associated with overaggressive activity & ignoring medical issues that could lead to further injury.   Pt remains appropraite for PT at this time & d/c to mother's home with home PT follow up remains appropriate as long as pt continues to make anticipated progress without recurrence of syncope. Barriers to Discharge Inaccessible home environment   Barriers to Discharge Comments improved ambulation with less assist, successful stair trial   Goals   Patient Goals to get better   STG Expiration Date 09/21/23   PT Treatment Day 4   Plan   Treatment/Interventions Functional transfer training;LE strengthening/ROM; Elevations; Therapeutic exercise; Endurance training;Patient/family training;Equipment eval/education; Bed mobility;Gait training   Progress Progressing toward goals   PT Frequency 3-5x/wk   Recommendation   PT Discharge Recommendation Home with home health rehabilitation  (progress to OPPT when medically appropriate)   Equipment Recommended Crutches  (LUE)   AM-PAC Basic Mobility Inpatient   Turning in Flat Bed Without Bedrails 4   Lying on Back to Sitting on Edge of Flat Bed Without Bedrails 2   Moving Bed to Chair 3   Standing Up From Chair Using Arms 3   Walk in Room 3   Climb 3-5 Stairs With Railing 2   Basic Mobility Inpatient Raw Score 17   Basic Mobility Standardized Score 39.67   Highest Level Of Mobility   JH-HLM Goal 5: Stand one or more mins   JH-HLM Achieved 7: Walk 25 feet or more       Demetra Bhatti PTA    An [facility] Basic Mobility Raw Score less than 17 suggests pt would benefit from post acute rehab. Please also refer to the recommendation of the Physical Therapist for safe discharge planning.

## 2023-09-14 NOTE — ASSESSMENT & PLAN NOTE
· Patient was noted to have strange behavior last night with rapidly changing mood. · UDS was sent that was positive for [facility]  · Patient fully oriented-GCS is 15  · Continue to monitor patient's mental status. · Possibly associated with starting clonidine for acute stress reaction. We will discontinue clonidine and start Seroquel given patient is paranoid this morning. · 9/13 patient was reevaluated by psych, who agreed with changing clonidine to Seroquel. They also recommended Prozac. · Patient will require outpatient follow-up with psychiatry.
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
## 2023-09-14 NOTE — PLAN OF CARE
Problem: Prexisting or High Potential for Compromised Skin Integrity  Goal: Skin integrity is maintained or improved  Description: INTERVENTIONS:  - Identify patients at risk for skin breakdown  - Assess and monitor skin integrity  - Assess and monitor nutrition and hydration status  - Monitor labs   - Assess for incontinence   - Turn and reposition patient  - Assist with mobility/ambulation  - Relieve pressure over bony prominences  - Avoid friction and shearing  - Provide appropriate hygiene as needed including keeping skin clean and dry  - Evaluate need for skin moisturizer/barrier cream  - Collaborate with interdisciplinary team   - Patient/family teaching  - Consider wound care consult   Outcome: Progressing     Problem: MOBILITY - ADULT  Goal: Maintain or return to baseline ADL function  Description: INTERVENTIONS:  -  Assess patient's ability to carry out ADLs; assess patient's baseline for ADL function and identify physical deficits which impact ability to perform ADLs (bathing, care of mouth/teeth, toileting, grooming, dressing, etc.)  - Assess/evaluate cause of self-care deficits   - Assess range of motion  - Assess patient's mobility; develop plan if impaired  - Assess patient's need for assistive devices and provide as appropriate  - Encourage maximum independence but intervene and supervise when necessary  - Involve family in performance of ADLs  - Assess for home care needs following discharge   - Consider OT consult to assist with ADL evaluation and planning for discharge  - Provide patient education as appropriate  Outcome: Progressing  Goal: Maintains/Returns to pre admission functional level  Description: INTERVENTIONS:  - Perform BMAT or MOVE assessment daily.   - Set and communicate daily mobility goal to care team and patient/family/caregiver. - Collaborate with rehabilitation services on mobility goals if consulted  - Perform Range of Motion *3** times a day.   - Reposition patient every **2* hours.   - Dangle patient *3** times a day  - Stand patient *3** times a day  - Ambulate patient **3* times a day  - Out of bed to chair *3** times a day   - Out of bed for meals *3** times a day  - Out of bed for toileting  - Record patient progress and toleration of activity level   Outcome: Progressing     Problem: SAFETY,RESTRAINT: NV/NON-SELF DESTRUCTIVE BEHAVIOR  Goal: Remains free of harm/injury (restraint for non violent/non self-detsructive behavior)  Description: INTERVENTIONS:  - Instruct patient/family regarding restraint use   - Assess and monitor physiologic and psychological status   - Provide interventions and comfort measures to meet assessed patient needs   - Identify and implement measures to help patient regain control  - Assess readiness for release of restraint   Outcome: Progressing  Goal: Returns to optimal restraint-free functioning  Description: INTERVENTIONS:  - Assess the patient's behavior and symptoms that indicate continued need for restraint  - Identify and implement measures to help patient regain control  - Assess readiness for release of restraint   Outcome: Progressing     Problem: PAIN - ADULT  Goal: Verbalizes/displays adequate comfort level or baseline comfort level  Description: Interventions:  - Encourage patient to monitor pain and request assistance  - Assess pain using appropriate pain scale  - Administer analgesics based on type and severity of pain and evaluate response  - Implement non-pharmacological measures as appropriate and evaluate response  - Consider cultural and social influences on pain and pain management  - Notify physician/advanced practitioner if interventions unsuccessful or patient reports new pain  Outcome: Progressing     Problem: INFECTION - ADULT  Goal: Absence or prevention of progression during hospitalization  Description: INTERVENTIONS:  - Assess and monitor for signs and symptoms of infection  - Monitor lab/diagnostic results  - Monitor all insertion sites, i.e. indwelling lines, tubes, and drains  - Monitor endotracheal if appropriate and nasal secretions for changes in amount and color  - [place] appropriate cooling/warming therapies per order  - Administer medications as ordered  - Instruct and encourage patient and family to use good hand hygiene technique  - Identify and instruct in appropriate isolation precautions for identified infection/condition  Outcome: Progressing  Goal: Absence of fever/infection during neutropenic period  Description: INTERVENTIONS:  - Monitor WBC    Outcome: Progressing     Problem: SAFETY ADULT  Goal: Maintain or return to baseline ADL function  Description: INTERVENTIONS:  -  Assess patient's ability to carry out ADLs; assess patient's baseline for ADL function and identify physical deficits which impact ability to perform ADLs (bathing, care of mouth/teeth, toileting, grooming, dressing, etc.)  - Assess/evaluate cause of self-care deficits   - Assess range of motion  - Assess patient's mobility; develop plan if impaired  - Assess patient's need for assistive devices and provide as appropriate  - Encourage maximum independence but intervene and supervise when necessary  - Involve family in performance of ADLs  - Assess for home care needs following discharge   - Consider OT consult to assist with ADL evaluation and planning for discharge  - Provide patient education as appropriate  Outcome: Progressing  Goal: Maintains/Returns to pre admission functional level  Description: INTERVENTIONS:  - Perform BMAT or MOVE assessment daily.   - Set and communicate daily mobility goal to care team and patient/family/caregiver. - Collaborate with rehabilitation services on mobility goals if consulted  - Perform Range of Motion *3** times a day. - Reposition patient every **2* hours.   - Dangle patient **3* times a day  - Stand patient *3** times a day  - Ambulate patient **3* times a day  - Out of bed to chair **3* times a day - Out of bed for meals **3* times a day  - Out of bed for toileting  - Record patient progress and toleration of activity level   Outcome: Progressing  Goal: Patient will remain free of falls  Description: INTERVENTIONS:  - Educate patient/family on patient safety including physical limitations  - Instruct patient to call for assistance with activity   - Consult OT/PT to assist with strengthening/mobility   - Keep Call bell within reach  - Keep bed low and locked with side rails adjusted as appropriate  - Keep care items and personal belongings within reach  - Initiate and maintain comfort rounds  - Make Fall Risk Sign visible to staff  - Offer Toileting every **2* Hours, in advance of need  - Initiate/Maintain *bed/chair**alarm  - Obtain necessary fall risk management equipment:   - Apply yellow socks and bracelet for high fall risk patients  - Consider moving patient to room near nurses station  Outcome: Progressing     Problem: DISCHARGE PLANNING  Goal: Discharge to home or other facility with appropriate resources  Description: INTERVENTIONS:  - Identify barriers to discharge w/patient and caregiver  - Arrange for needed discharge resources and transportation as appropriate  - Identify discharge learning needs (meds, wound care, etc.)  - Arrange for interpretive services to assist at discharge as needed  - Refer to Case Management Department for coordinating discharge planning if the patient needs post-hospital services based on physician/advanced practitioner order or complex needs related to functional status, cognitive ability, or social support system  Outcome: Progressing     Problem: Knowledge Deficit  Goal: Patient/family/caregiver demonstrates understanding of disease process, treatment plan, medications, and discharge instructions  Description: Complete learning assessment and assess knowledge base.   Interventions:  - Provide teaching at level of understanding  - Provide teaching via preferred learning methods  Outcome: Progressing     Problem: Nutrition/Hydration-ADULT  Goal: Nutrient/Hydration intake appropriate for improving, restoring or maintaining nutritional needs  Description: Monitor and assess patient's nutrition/hydration status for malnutrition. Collaborate with interdisciplinary team and initiate plan and interventions as ordered. Monitor patient's weight and dietary intake as ordered or per policy. Utilize nutrition screening tool and intervene as necessary. Determine patient's food preferences and provide high-protein, high-caloric foods as appropriate.      INTERVENTIONS:  - Monitor oral intake, urinary output, labs, and treatment plans  - Assess nutrition and hydration status and recommend course of action  - Evaluate amount of meals eaten  - Assist patient with eating if necessary   - Allow adequate time for meals  - Recommend/ encourage appropriate diets, oral nutritional supplements, and vitamin/mineral supplements  - Order, calculate, and assess calorie counts as needed  - Recommend, monitor, and adjust tube feedings and TPN/PPN based on assessed needs  - Assess need for intravenous fluids  - Provide specific nutrition/hydration education as appropriate  - Include patient/family/caregiver in decisions related to nutrition  Outcome: Progressing     Problem: HEMATOLOGIC - ADULT  Goal: Maintains hematologic stability  Description: INTERVENTIONS  - Assess for signs and symptoms of bleeding or hemorrhage  - Monitor labs  - Administer supportive blood products/factors as ordered and appropriate  Outcome: Progressing     Problem: MUSCULOSKELETAL - ADULT  Goal: Maintain or return mobility to safest level of function  Description: INTERVENTIONS:  - Assess patient's ability to carry out ADLs; assess patient's baseline for ADL function and identify physical deficits which impact ability to perform ADLs (bathing, care of mouth/teeth, toileting, grooming, dressing, etc.)  - Assess/evaluate cause of self-care deficits   - Assess range of motion  - Assess patient's mobility  - Assess patient's need for assistive devices and provide as appropriate  - Encourage maximum independence but intervene and supervise when necessary  - Involve family in performance of ADLs  - Assess for home care needs following discharge   - Consider OT consult to assist with ADL evaluation and planning for discharge  - Provide patient education as appropriate  Outcome: Progressing  Goal: Maintain proper alignment of affected body part  Description: INTERVENTIONS:  - Support, maintain and protect limb and body alignment  - Provide patient/ family with appropriate education  Outcome: Progressing

## 2023-09-14 NOTE — ASSESSMENT & PLAN NOTE
- Leukocytosis peaked at 20 and is now down trended for the past several days to 12  - All wounds addressed on 9/11 of which are stable  - UA negative   - Lactic negative on 9/10  - Cultures negative at 72 hours  - Fever curve improved  - CT scan shows possible pneumonia; surgery site is stable--follow-up CXR showed no acute cardiopulmonary disease  - covidmarissa 9/13 negative  -Patient is asymptomatic

## 2023-09-15 VITALS
HEART RATE: 87 BPM | HEIGHT: 70 IN | BODY MASS INDEX: 31.35 KG/M2 | TEMPERATURE: 99.4 F | WEIGHT: 219 LBS | RESPIRATION RATE: 19 BRPM | DIASTOLIC BLOOD PRESSURE: 90 MMHG | SYSTOLIC BLOOD PRESSURE: 140 MMHG | OXYGEN SATURATION: 100 %

## 2023-09-15 LAB
BACTERIA BLD CULT: NORMAL
BACTERIA BLD CULT: NORMAL

## 2023-09-15 PROCEDURE — 97116 GAIT TRAINING THERAPY: CPT

## 2023-09-15 PROCEDURE — 99238 HOSP IP/OBS DSCHRG MGMT 30/<: CPT | Performed by: STUDENT IN AN ORGANIZED HEALTH CARE EDUCATION/TRAINING PROGRAM

## 2023-09-15 PROCEDURE — 97530 THERAPEUTIC ACTIVITIES: CPT

## 2023-09-15 RX ORDER — OXYCODONE HYDROCHLORIDE 10 MG/1
TABLET ORAL
Qty: 20 TABLET | Refills: 0 | Status: SHIPPED | OUTPATIENT
Start: 2023-09-15

## 2023-09-15 RX ORDER — FLUOXETINE 10 MG/1
10 CAPSULE ORAL DAILY
Qty: 90 CAPSULE | Refills: 0 | Status: SHIPPED | OUTPATIENT
Start: 2023-09-16

## 2023-09-15 RX ORDER — ACETAMINOPHEN 325 MG/1
975 TABLET ORAL EVERY 8 HOURS PRN
Refills: 0
Start: 2023-09-15

## 2023-09-15 RX ORDER — QUETIAPINE FUMARATE 50 MG/1
50 TABLET, FILM COATED ORAL
Qty: 90 TABLET | Refills: 0 | Status: SHIPPED | OUTPATIENT
Start: 2023-09-15 | End: 2023-12-14

## 2023-09-15 RX ORDER — AMOXICILLIN 250 MG
1 CAPSULE ORAL 2 TIMES DAILY
Qty: 60 TABLET | Refills: 0 | Status: SHIPPED | OUTPATIENT
Start: 2023-09-15

## 2023-09-15 RX ORDER — METHOCARBAMOL 500 MG/1
500 TABLET, FILM COATED ORAL EVERY 6 HOURS PRN
Refills: 0
Start: 2023-09-15

## 2023-09-15 RX ORDER — QUETIAPINE FUMARATE 25 MG/1
25 TABLET, FILM COATED ORAL DAILY
Qty: 90 TABLET | Refills: 0 | Status: SHIPPED | OUTPATIENT
Start: 2023-09-16 | End: 2023-12-15

## 2023-09-15 RX ADMIN — OXYCODONE HYDROCHLORIDE 10 MG: 10 TABLET ORAL at 09:28

## 2023-09-15 RX ADMIN — PANTOPRAZOLE SODIUM 40 MG: 40 TABLET, DELAYED RELEASE ORAL at 05:39

## 2023-09-15 RX ADMIN — OXYCODONE HYDROCHLORIDE 10 MG: 10 TABLET ORAL at 14:47

## 2023-09-15 RX ADMIN — METHOCARBAMOL 500 MG: 500 TABLET ORAL at 05:39

## 2023-09-15 RX ADMIN — ENOXAPARIN SODIUM 30 MG: 30 INJECTION SUBCUTANEOUS at 09:27

## 2023-09-15 RX ADMIN — BACITRACIN ZINC 1 SMALL APPLICATION: 500 OINTMENT TOPICAL at 09:28

## 2023-09-15 RX ADMIN — ACETAMINOPHEN 975 MG: 325 TABLET, FILM COATED ORAL at 05:39

## 2023-09-15 RX ADMIN — ACETAMINOPHEN 975 MG: 325 TABLET, FILM COATED ORAL at 14:42

## 2023-09-15 RX ADMIN — METHOCARBAMOL 500 MG: 500 TABLET ORAL at 11:51

## 2023-09-15 RX ADMIN — OXYCODONE HYDROCHLORIDE 10 MG: 10 TABLET ORAL at 03:07

## 2023-09-15 NOTE — PHYSICAL THERAPY NOTE
Physical Therapy Progress Note     09/15/23 1145   PT Last Visit   PT Visit Date 09/15/23   Note Type   Note Type Treatment   Pain Assessment   Pain Score No Pain   Restrictions/Precautions   RUE Weight Bearing Per Order NWB   Braces or Orthoses Sling  (not present in room)   Other Precautions WBS;Pain; Fall Risk;Multiple lines  (L thigh THA drain)   Subjective   Subjective Pt encountered standing in room independently without staff present. Reports no complaints & denies changes in status with activity. Is eager to d/c today. Transfers   Sit to Stand 6  Modified independent   Stand to Sit 6  Modified independent   Ambulation/Elevation   Gait pattern Short stride; Inconsistent kris;Decreased L stance;Decreased foot clearance; Antalgic; Improper Weight shift   Gait Assistance 5  Supervision   Additional items Assist x 1   Assistive Device Axillary crutches  (LUE)   Distance 200', 300'   Stair Management Assistance 5  Supervision   Additional items Assist x 1   Stair Management Technique One rail L;Step to pattern; Foreward; No rails; With crutches   Number of Stairs 6  (x3 LHR, then x3 L crutch)   Balance   Static Sitting Good   Static Standing Fair   Ambulatory Fair -   Activity Tolerance   Activity Tolerance Patient tolerated treatment well   Nurse Daphne Larson RN   Assessment   Prognosis Good   Problem List Decreased strength;Decreased range of motion;Decreased endurance; Impaired balance;Decreased mobility; Decreased safety awareness;Orthopedic restrictions;Pain   Assessment Pt demonstrated improved functional mobilty & endurance today, ambulating community distances with use of L crutch without incident, demosntrating improved pace & stride length throughout session compared to previous trials. He did demonstrate 1 minor R lateral LOB when negotiating obstacle in hallway, but utilized appropriate step out response to regain balance quickly without halting pace.   He was instructed in & negotiated steps as noted above, doing so without incident & demonstrating good carryover of instrucions throughout. Time taken to encourage mobiltiy upon return to community, but alos consider energy conservation to reduce LLE pain, muscle weakness & potential increased swelling with excessive activity. pt instructed in awareness of L thigh drain to prevent addicental dislodging from site at this time. Pt verbalized understanding to all education provided & offers no further questions or concerns at this time. discussed pt progress with supervising PT, Lexy Rinaldi DPT and will d/c acute hospital PT services at this time since pt is ambulating at level expected of him to d/c home without need for physical assist to perform aforementioned tasks. Plan for home PT follow up at d/c as pt continues to progress to PLOF. Goals   Patient Goals to go home   STG Expiration Date 09/21/23   PT Treatment Day 5   Plan   Treatment/Interventions Functional transfer training;LE strengthening/ROM; Elevations; Therapeutic exercise; Endurance training;Patient/family training;Equipment eval/education; Bed mobility;Gait training   Progress Progressing toward goals   PT Frequency 3-5x/wk   Recommendation   PT Discharge Recommendation Home with home health rehabilitation  (progress to OPPT when medically appropriate)   Equipment Recommended Crutches  (LUE)   AM-PAC Basic Mobility Inpatient   Turning in Flat Bed Without Bedrails 4   Lying on Back to Sitting on Edge of Flat Bed Without Bedrails 4   Moving Bed to Chair 4   Standing Up From Chair Using Arms 4   Walk in Room 4   Climb 3-5 Stairs With Railing 4   Basic Mobility Inpatient Raw Score 24   Basic Mobility Standardized Score 57.68   Highest Level Of Mobility   JH-HLM Goal 8: Walk 250 feet or more   JH-HLM Achieved 8: Walk 250 feet ot more       Fuad Colon PTA    [facility] Basic Mobility Raw Score less than 17 suggests pt would benefit from post acute rehab.   Please also refer to the recommendation of the Physical Therapist for safe discharge planning.

## 2023-09-15 NOTE — PLAN OF CARE
Problem: INFECTION - ADULT  Goal: Absence or prevention of progression during hospitalization  Description: INTERVENTIONS:  - Assess and monitor for signs and symptoms of infection  - Monitor lab/diagnostic results  - Monitor all insertion sites, i.e. indwelling lines, tubes, and drains  - Monitor endotracheal if appropriate and nasal secretions for changes in amount and color  - [place] appropriate cooling/warming therapies per order  - Administer medications as ordered  - Instruct and encourage patient and family to use good hand hygiene technique  - Identify and instruct in appropriate isolation precautions for identified infection/condition  9/15/2023 1354 by Demetris Hunt RN  Outcome: Progressing  9/15/2023 1354 by Demetris Hunt RN  Outcome: Progressing  Goal: Absence of fever/infection during neutropenic period  Description: INTERVENTIONS:  - Monitor WBC    9/15/2023 1354 by Demetris Hunt RN  Outcome: Progressing  9/15/2023 1354 by Demetris Hunt RN  Outcome: Progressing     Problem: PAIN - ADULT  Goal: Verbalizes/displays adequate comfort level or baseline comfort level  Description: Interventions:  - Encourage patient to monitor pain and request assistance  - Assess pain using appropriate pain scale  - Administer analgesics based on type and severity of pain and evaluate response  - Implement non-pharmacological measures as appropriate and evaluate response  - Consider cultural and social influences on pain and pain management  - Notify physician/advanced practitioner if interventions unsuccessful or patient reports new pain  9/15/2023 Shandra4 by Demetris Hunt RN  Outcome: Progressing  9/15/2023 1354 by Demetris Hunt RN  Outcome: Progressing
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
## 2023-09-15 NOTE — ASSESSMENT & PLAN NOTE
· Patient was noted to have strange behavior last night with rapidly changing mood. · UDS was sent that was positive for [facility]  · Patient fully oriented-GCS is 15  · Continue to monitor patient's mental status. · Possibly associated with starting clonidine for acute stress reaction. We will discontinue clonidine and start Seroquel given patient is paranoid this morning. · 9/13 patient was reevaluated by psych, who agreed with changing clonidine to Seroquel. They also recommended Prozac. · Patient will require outpatient follow-up with psychiatry.

## 2023-09-15 NOTE — ASSESSMENT & PLAN NOTE
- Right Shoulder dislocation, present on admission.  - Status post reduction  - Appreciate Orthopedic surgery evaluation, recommendations and interventions as noted. - Maintain non weightbearing status on the right upper extremity in sling.  - Monitor right upper extremity neurovascular exam.  - Continue multimodal analgesic regimen.  - Continue DVT prophylaxis. - PT and OT evaluation and treatment as indicated.   - Outpatient follow up with Orthopedic surgery for re-evaluation.   - NWB RUE in sling

## 2023-09-15 NOTE — ASSESSMENT & PLAN NOTE
- Leukocytosis peaked at 20 and is now down trended for the past several days to 12  - All wounds addressed on 9/11 of which are stable  - UA negative   - Lactic negative on 9/10  - Cultures negative  - Afebrile  - CT scan shows possible pneumonia; surgery site is stable--follow-up CXR showed no acute cardiopulmonary disease  - covid, procal 9/13 negative  -Patient is asymptomatic  - Follow up with PCP
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
## 2023-09-15 NOTE — PLAN OF CARE
Problem: PHYSICAL THERAPY ADULT  Goal: Performs mobility at highest level of function for planned discharge setting. See evaluation for individualized goals. Description: Treatment/Interventions: ADL retraining, Functional transfer training, LE strengthening/ROM, Elevations, Therapeutic exercise, Endurance training, Patient/family training, Equipment eval/education, Bed mobility, Gait training, Compensatory technique education, Spoke to nursing, Spoke to case management, Spoke to advanced practitioner, OT  Equipment Recommended:  (TBD w/ progress)       See flowsheet documentation for full assessment, interventions and recommendations. Outcome: Progressing  Note: Prognosis: Good  Problem List: Decreased strength, Decreased range of motion, Decreased endurance, Impaired balance, Decreased mobility, Decreased safety awareness, Orthopedic restrictions, Pain  Assessment: Pt demonstrated improved functional mobilty & endurance today, ambulating community distances with use of L crutch without incident, demosntrating improved pace & stride length throughout session compared to previous trials. He did demonstrate 1 minor R lateral LOB when negotiating obstacle in hallway, but utilized appropriate step out response to regain balance quickly without halting pace. He was instructed in & negotiated steps as noted above, doing so without incident & demonstrating good carryover of instrucions throughout. Time taken to encourage mobiltiy upon return to community, but alos consider energy conservation to reduce LLE pain, muscle weakness & potential increased swelling with excessive activity. pt instructed in awareness of L thigh drain to prevent addicental dislodging from site at this time. Pt verbalized understanding to all education provided & offers no further questions or concerns at this time.   discussed pt progress with supervising PT, Malika Goyal DPT and will d/c acute hospital PT services at this time since pt is ambulating at level expected of him to d/c home without need for physical assist to perform aforementioned tasks. Plan for home PT follow up at d/c as pt continues to progress to PLOF. Barriers to Discharge: Inaccessible home environment  Barriers to Discharge Comments: improved ambulation with less assist, successful stair trial  PT Discharge Recommendation: Home with home health rehabilitation (progress to OPPT when medically appropriate)    See flowsheet documentation for full assessment.

## 2023-09-16 ENCOUNTER — HOME CARE VISIT (OUTPATIENT)
Dept: HOME HEALTH SERVICES | Facility: HOME HEALTHCARE | Age: 33
End: 2023-09-16
Payer: COMMERCIAL

## 2023-09-16 VITALS
HEART RATE: 96 BPM | TEMPERATURE: 98.1 F | SYSTOLIC BLOOD PRESSURE: 140 MMHG | RESPIRATION RATE: 18 BRPM | OXYGEN SATURATION: 99 % | DIASTOLIC BLOOD PRESSURE: 82 MMHG

## 2023-09-16 PROCEDURE — G0299 HHS/HOSPICE OF RN EA 15 MIN: HCPCS

## 2023-09-16 PROCEDURE — 400013 VN SOC

## 2023-09-19 ENCOUNTER — HOME CARE VISIT (OUTPATIENT)
Dept: HOME HEALTH SERVICES | Facility: HOME HEALTHCARE | Age: 33
End: 2023-09-19
Payer: COMMERCIAL

## 2023-09-19 VITALS
SYSTOLIC BLOOD PRESSURE: 158 MMHG | DIASTOLIC BLOOD PRESSURE: 78 MMHG | RESPIRATION RATE: 18 BRPM | OXYGEN SATURATION: 97 % | HEART RATE: 93 BPM | TEMPERATURE: 97.5 F

## 2023-09-19 PROCEDURE — G0299 HHS/HOSPICE OF RN EA 15 MIN: HCPCS

## 2023-09-19 PROCEDURE — G0151 HHCP-SERV OF PT,EA 15 MIN: HCPCS

## 2023-09-21 ENCOUNTER — HOME CARE VISIT (OUTPATIENT)
Dept: HOME HEALTH SERVICES | Facility: HOME HEALTHCARE | Age: 33
End: 2023-09-21
Payer: COMMERCIAL

## 2023-09-21 VITALS
HEART RATE: 72 BPM | SYSTOLIC BLOOD PRESSURE: 124 MMHG | TEMPERATURE: 98.3 F | DIASTOLIC BLOOD PRESSURE: 84 MMHG | RESPIRATION RATE: 18 BRPM

## 2023-09-21 PROCEDURE — G0299 HHS/HOSPICE OF RN EA 15 MIN: HCPCS

## 2023-09-27 PROCEDURE — G0180 MD CERTIFICATION HHA PATIENT: HCPCS | Performed by: EMERGENCY MEDICINE

## (undated) PROCEDURE — 0BH17EZ INSERTION OF ENDOTRACHEAL AIRWAY INTO TRACHEA, VIA NATURAL OR ARTIFICIAL OPENING: ICD-10-PCS

## (undated) PROCEDURE — 5A1945Z RESPIRATORY VENTILATION, 24-96 CONSECUTIVE HOURS: ICD-10-PCS

## (undated) PROCEDURE — 5A12012 PERFORMANCE OF CARDIAC OUTPUT, SINGLE, MANUAL: ICD-10-PCS

## (undated) DEVICE — ACE WRAP 6 IN UNSTERILE

## (undated) DEVICE — SURGICEL 4 X 8

## (undated) DEVICE — HYDROPHILIC WOUND DRESSING WITH ZINC PLUS VITAMINS A AND B6.: Brand: DERMAGRAN®-B

## (undated) DEVICE — ACE WRAP 4 IN STERILE

## (undated) DEVICE — DRESSING XEROFORM 5 X 9

## (undated) DEVICE — 3000CC GUARDIAN II: Brand: GUARDIAN

## (undated) DEVICE — KERLIX BANDAGE ROLL: Brand: KERLIX

## (undated) DEVICE — GLOVE INDICATOR UNDERGLOVE SZ 6 BLUE

## (undated) DEVICE — COBAN 4 IN STERILE

## (undated) DEVICE — GLOVE SRG BIOGEL 6

## (undated) DEVICE — STRETCH BANDAGE: Brand: CURITY

## (undated) DEVICE — PREMIUM DRY TRAY LF: Brand: MEDLINE INDUSTRIES, INC.

## (undated) DEVICE — BETHLEHEM UNIV MAJOR ORTHO,KIT: Brand: CARDINAL HEALTH

## (undated) DEVICE — INTENDED FOR TISSUE SEPARATION, AND OTHER PROCEDURES THAT REQUIRE A SHARP SURGICAL BLADE TO PUNCTURE OR CUT.: Brand: BARD-PARKER SAFETY BLADES SIZE 15, STERILE

## (undated) DEVICE — 3M™ TEGADERM™ TRANSPARENT FILM DRESSING FRAME STYLE, 1628, 6 IN X 8 IN (15 CM X 20 CM), 10/CT 8CT/CASE: Brand: 3M™ TEGADERM™

## (undated) DEVICE — PLUMEPEN PRO 10FT

## (undated) DEVICE — GAUZE SPONGES,16 PLY: Brand: CURITY

## (undated) DEVICE — JP CHANNEL DRAIN, 19FR HUBLESS: Brand: CARDINAL HEALTH

## (undated) DEVICE — ABDOMINAL PAD: Brand: DERMACEA